# Patient Record
Sex: FEMALE | Race: WHITE | URBAN - METROPOLITAN AREA
[De-identification: names, ages, dates, MRNs, and addresses within clinical notes are randomized per-mention and may not be internally consistent; named-entity substitution may affect disease eponyms.]

---

## 2018-07-09 ENCOUNTER — INPATIENT (INPATIENT)
Facility: HOSPITAL | Age: 58
LOS: 1 days | Discharge: HOME | End: 2018-07-11
Attending: FAMILY MEDICINE | Admitting: FAMILY MEDICINE
Payer: MEDICAID

## 2018-07-09 VITALS — HEIGHT: 64 IN | WEIGHT: 160.06 LBS

## 2018-07-09 LAB
ALBUMIN SERPL ELPH-MCNC: 4.1 G/DL — SIGNIFICANT CHANGE UP (ref 3.3–5)
ALP SERPL-CCNC: 81 U/L — SIGNIFICANT CHANGE UP (ref 40–120)
ALT FLD-CCNC: 33 U/L — SIGNIFICANT CHANGE UP (ref 12–78)
ANION GAP SERPL CALC-SCNC: 6 MMOL/L — SIGNIFICANT CHANGE UP (ref 5–17)
APPEARANCE UR: ABNORMAL
APTT BLD: 33.6 SEC — SIGNIFICANT CHANGE UP (ref 27.5–37.4)
AST SERPL-CCNC: 18 U/L — SIGNIFICANT CHANGE UP (ref 15–37)
BACTERIA # UR AUTO: ABNORMAL
BASOPHILS # BLD AUTO: 0.06 K/UL — SIGNIFICANT CHANGE UP (ref 0–0.2)
BASOPHILS NFR BLD AUTO: 0.8 % — SIGNIFICANT CHANGE UP (ref 0–2)
BILIRUB SERPL-MCNC: 0.5 MG/DL — SIGNIFICANT CHANGE UP (ref 0.2–1.2)
BILIRUB UR-MCNC: ABNORMAL
BUN SERPL-MCNC: 23 MG/DL — SIGNIFICANT CHANGE UP (ref 7–23)
CALCIUM SERPL-MCNC: 9.8 MG/DL — SIGNIFICANT CHANGE UP (ref 8.5–10.1)
CHLORIDE SERPL-SCNC: 108 MMOL/L — SIGNIFICANT CHANGE UP (ref 96–108)
CO2 SERPL-SCNC: 28 MMOL/L — SIGNIFICANT CHANGE UP (ref 22–31)
COD CRY URNS QL: ABNORMAL
COLOR SPEC: ABNORMAL
CREAT SERPL-MCNC: 1.05 MG/DL — SIGNIFICANT CHANGE UP (ref 0.5–1.3)
DIFF PNL FLD: ABNORMAL
EOSINOPHIL # BLD AUTO: 0.18 K/UL — SIGNIFICANT CHANGE UP (ref 0–0.5)
EOSINOPHIL NFR BLD AUTO: 2.3 % — SIGNIFICANT CHANGE UP (ref 0–6)
EPI CELLS # UR: SIGNIFICANT CHANGE UP
GLUCOSE SERPL-MCNC: 100 MG/DL — HIGH (ref 70–99)
GLUCOSE UR QL: NEGATIVE MG/DL — SIGNIFICANT CHANGE UP
HCT VFR BLD CALC: 45.7 % — HIGH (ref 34.5–45)
HGB BLD-MCNC: 15.8 G/DL — HIGH (ref 11.5–15.5)
IMM GRANULOCYTES NFR BLD AUTO: 0.1 % — SIGNIFICANT CHANGE UP (ref 0–1.5)
INR BLD: 1.03 RATIO — SIGNIFICANT CHANGE UP (ref 0.88–1.16)
KETONES UR-MCNC: NEGATIVE — SIGNIFICANT CHANGE UP
LEUKOCYTE ESTERASE UR-ACNC: NEGATIVE — SIGNIFICANT CHANGE UP
LIDOCAIN IGE QN: 153 U/L — SIGNIFICANT CHANGE UP (ref 73–393)
LYMPHOCYTES # BLD AUTO: 2.84 K/UL — SIGNIFICANT CHANGE UP (ref 1–3.3)
LYMPHOCYTES # BLD AUTO: 35.8 % — SIGNIFICANT CHANGE UP (ref 13–44)
MCHC RBC-ENTMCNC: 29.6 PG — SIGNIFICANT CHANGE UP (ref 27–34)
MCHC RBC-ENTMCNC: 34.6 GM/DL — SIGNIFICANT CHANGE UP (ref 32–36)
MCV RBC AUTO: 85.7 FL — SIGNIFICANT CHANGE UP (ref 80–100)
MONOCYTES # BLD AUTO: 0.74 K/UL — SIGNIFICANT CHANGE UP (ref 0–0.9)
MONOCYTES NFR BLD AUTO: 9.3 % — SIGNIFICANT CHANGE UP (ref 2–14)
NEUTROPHILS # BLD AUTO: 4.11 K/UL — SIGNIFICANT CHANGE UP (ref 1.8–7.4)
NEUTROPHILS NFR BLD AUTO: 51.7 % — SIGNIFICANT CHANGE UP (ref 43–77)
NITRITE UR-MCNC: POSITIVE
NRBC # BLD: 0 /100 WBCS — SIGNIFICANT CHANGE UP (ref 0–0)
PH UR: 5 — SIGNIFICANT CHANGE UP (ref 5–8)
PLATELET # BLD AUTO: 284 K/UL — SIGNIFICANT CHANGE UP (ref 150–400)
POTASSIUM SERPL-MCNC: 4.4 MMOL/L — SIGNIFICANT CHANGE UP (ref 3.5–5.3)
POTASSIUM SERPL-SCNC: 4.4 MMOL/L — SIGNIFICANT CHANGE UP (ref 3.5–5.3)
PROT SERPL-MCNC: 7.9 GM/DL — SIGNIFICANT CHANGE UP (ref 6–8.3)
PROT UR-MCNC: NEGATIVE MG/DL — SIGNIFICANT CHANGE UP
PROTHROM AB SERPL-ACNC: 11.1 SEC — SIGNIFICANT CHANGE UP (ref 9.8–12.7)
RBC # BLD: 5.33 M/UL — HIGH (ref 3.8–5.2)
RBC # FLD: 12.4 % — SIGNIFICANT CHANGE UP (ref 10.3–14.5)
RBC CASTS # UR COMP ASSIST: >50 /HPF (ref 0–4)
SODIUM SERPL-SCNC: 142 MMOL/L — SIGNIFICANT CHANGE UP (ref 135–145)
SP GR SPEC: 1.02 — SIGNIFICANT CHANGE UP (ref 1.01–1.02)
UROBILINOGEN FLD QL: 8 MG/DL
WBC # BLD: 7.94 K/UL — SIGNIFICANT CHANGE UP (ref 3.8–10.5)
WBC # FLD AUTO: 7.94 K/UL — SIGNIFICANT CHANGE UP (ref 3.8–10.5)
WBC UR QL: SIGNIFICANT CHANGE UP

## 2018-07-09 PROCEDURE — 74176 CT ABD & PELVIS W/O CONTRAST: CPT | Mod: 26

## 2018-07-09 PROCEDURE — 71045 X-RAY EXAM CHEST 1 VIEW: CPT | Mod: 26

## 2018-07-09 PROCEDURE — 93010 ELECTROCARDIOGRAM REPORT: CPT

## 2018-07-09 RX ORDER — SODIUM CHLORIDE 9 MG/ML
1000 INJECTION INTRAMUSCULAR; INTRAVENOUS; SUBCUTANEOUS ONCE
Qty: 0 | Refills: 0 | Status: COMPLETED | OUTPATIENT
Start: 2018-07-09 | End: 2018-07-09

## 2018-07-09 RX ORDER — SODIUM CHLORIDE 9 MG/ML
3 INJECTION INTRAMUSCULAR; INTRAVENOUS; SUBCUTANEOUS ONCE
Qty: 0 | Refills: 0 | Status: COMPLETED | OUTPATIENT
Start: 2018-07-09 | End: 2018-07-09

## 2018-07-09 RX ORDER — CEFTRIAXONE 500 MG/1
1 INJECTION, POWDER, FOR SOLUTION INTRAMUSCULAR; INTRAVENOUS ONCE
Qty: 0 | Refills: 0 | Status: DISCONTINUED | OUTPATIENT
Start: 2018-07-09 | End: 2018-07-09

## 2018-07-09 RX ORDER — CEFTRIAXONE 500 MG/1
1000 INJECTION, POWDER, FOR SOLUTION INTRAMUSCULAR; INTRAVENOUS ONCE
Qty: 0 | Refills: 0 | Status: COMPLETED | OUTPATIENT
Start: 2018-07-09 | End: 2018-07-09

## 2018-07-09 RX ORDER — TAMSULOSIN HYDROCHLORIDE 0.4 MG/1
0.4 CAPSULE ORAL AT BEDTIME
Qty: 0 | Refills: 0 | Status: DISCONTINUED | OUTPATIENT
Start: 2018-07-09 | End: 2018-07-11

## 2018-07-09 RX ORDER — KETOROLAC TROMETHAMINE 30 MG/ML
30 SYRINGE (ML) INJECTION ONCE
Qty: 0 | Refills: 0 | Status: DISCONTINUED | OUTPATIENT
Start: 2018-07-09 | End: 2018-07-09

## 2018-07-09 RX ADMIN — SODIUM CHLORIDE 1000 MILLILITER(S): 9 INJECTION INTRAMUSCULAR; INTRAVENOUS; SUBCUTANEOUS at 20:32

## 2018-07-09 RX ADMIN — CEFTRIAXONE 1000 MILLIGRAM(S): 500 INJECTION, POWDER, FOR SOLUTION INTRAMUSCULAR; INTRAVENOUS at 23:03

## 2018-07-09 RX ADMIN — SODIUM CHLORIDE 3 MILLILITER(S): 9 INJECTION INTRAMUSCULAR; INTRAVENOUS; SUBCUTANEOUS at 20:33

## 2018-07-09 RX ADMIN — Medication 30 MILLIGRAM(S): at 20:45

## 2018-07-09 RX ADMIN — Medication 30 MILLIGRAM(S): at 20:32

## 2018-07-09 NOTE — ED ADULT NURSE REASSESSMENT NOTE - REASSESS COMMUNICATION
pt continues to state relief of pain. Pt urinated without difficulty at this time. Bladder scan previously shower 491cc prior to urinating. pt sts 2 episodes of not being able to urinate. denies dysuria. Dr harrington aware of all.

## 2018-07-09 NOTE — ED STATDOCS - PROGRESS NOTE DETAILS
56 y/o female with a PMHx of HTN presents to the ED c/o mild dysuria, lower abd pressure, back pain x1 week. Pt was dx with a UTI 5 days ago with + blood in urine. Pt was on Cipro for 3 days and then Keflex for 2 days with no relief. Today, pt took pain medication at 15:00 and then Tylenol at 18:00. No nausea, vomiting. NKDA. Patient seen and evaluated, ED attending note and orders reviewed, will continue with patient follow up and care.  -Christian PEREZ, PAYossiC Kash Aguilera.  Arely Kingsley PA-C I spoke with Dr. Aguilera and he agrees with plan of care for admission.  He will consult on patient tomorrow.  Arely Kingsley PA-C

## 2018-07-09 NOTE — ED STATDOCS - OBJECTIVE STATEMENT
58 y/o female with a PMHx of HTN presents to the ED c/o mild dysuria, lower abd pressure, back pain x1 week. Pt was dx with a UTI 5 days ago with + blood in urine. Pt was on Cipro for 3 days and then Keflex for 2 days with no relief. Today, pt took pain medication at 15:00 and then Tylenol at 18:00. No nausea, vomiting. NKDA.

## 2018-07-09 NOTE — ED STATDOCS - NS_ ATTENDINGSCRIBEDETAILS _ED_A_ED_FT
I, Garrick Bedolla MD,  performed the initial face to face bedside interview with this patient regarding history of present illness, review of symptoms and relevant past medical, social and family history.  I completed an independent physical examination.    The history, relevant review of systems, past medical and surgical history, medical decision making, and physical examination was documented by the scribe in my presence and I attest to the accuracy of the documentation.

## 2018-07-09 NOTE — ED STATDOCS - ATTENDING CONTRIBUTION TO CARE
I, Garrick Bedolla MD,  performed the initial face to face bedside interview with this patient regarding history of present illness, review of symptoms and relevant past medical, social and family history.  I completed an independent physical examination.  I was the initial provider who evaluated this patient. I have signed out the follow up of any pending tests (i.e. labs, radiological studies) to the ACP.  I have communicated the patient’s plan of care and disposition with the ACP.

## 2018-07-09 NOTE — ED ADULT TRIAGE NOTE - CHIEF COMPLAINT QUOTE
lt sided kidney pain  from MOnday denies fever +diarrhea 3times today  no blood in urine  pt on abx po meds all most week not working

## 2018-07-10 ENCOUNTER — RESULT REVIEW (OUTPATIENT)
Age: 58
End: 2018-07-10

## 2018-07-10 DIAGNOSIS — N39.0 URINARY TRACT INFECTION, SITE NOT SPECIFIED: ICD-10-CM

## 2018-07-10 DIAGNOSIS — N13.2 HYDRONEPHROSIS WITH RENAL AND URETERAL CALCULOUS OBSTRUCTION: ICD-10-CM

## 2018-07-10 DIAGNOSIS — N23 UNSPECIFIED RENAL COLIC: ICD-10-CM

## 2018-07-10 PROCEDURE — 88300 SURGICAL PATH GROSS: CPT | Mod: 26

## 2018-07-10 PROCEDURE — 52356 CYSTO/URETERO W/LITHOTRIPSY: CPT | Mod: LT

## 2018-07-10 PROCEDURE — 99285 EMERGENCY DEPT VISIT HI MDM: CPT

## 2018-07-10 PROCEDURE — 99253 IP/OBS CNSLTJ NEW/EST LOW 45: CPT | Mod: 25

## 2018-07-10 RX ORDER — IBUPROFEN 200 MG
400 TABLET ORAL EVERY 6 HOURS
Qty: 0 | Refills: 0 | Status: DISCONTINUED | OUTPATIENT
Start: 2018-07-10 | End: 2018-07-10

## 2018-07-10 RX ORDER — OXYCODONE HYDROCHLORIDE 5 MG/1
10 TABLET ORAL EVERY 6 HOURS
Qty: 0 | Refills: 0 | Status: DISCONTINUED | OUTPATIENT
Start: 2018-07-10 | End: 2018-07-10

## 2018-07-10 RX ORDER — DOCUSATE SODIUM 100 MG
100 CAPSULE ORAL THREE TIMES A DAY
Qty: 0 | Refills: 0 | Status: DISCONTINUED | OUTPATIENT
Start: 2018-07-10 | End: 2018-07-11

## 2018-07-10 RX ORDER — ONDANSETRON 8 MG/1
4 TABLET, FILM COATED ORAL ONCE
Qty: 0 | Refills: 0 | Status: DISCONTINUED | OUTPATIENT
Start: 2018-07-10 | End: 2018-07-10

## 2018-07-10 RX ORDER — PHENAZOPYRIDINE HCL 100 MG
100 TABLET ORAL THREE TIMES A DAY
Qty: 0 | Refills: 0 | Status: DISCONTINUED | OUTPATIENT
Start: 2018-07-10 | End: 2018-07-11

## 2018-07-10 RX ORDER — CEFTRIAXONE 500 MG/1
1000 INJECTION, POWDER, FOR SOLUTION INTRAMUSCULAR; INTRAVENOUS EVERY 24 HOURS
Qty: 0 | Refills: 0 | Status: DISCONTINUED | OUTPATIENT
Start: 2018-07-10 | End: 2018-07-11

## 2018-07-10 RX ORDER — OXYCODONE HYDROCHLORIDE 5 MG/1
5 TABLET ORAL EVERY 6 HOURS
Qty: 0 | Refills: 0 | Status: DISCONTINUED | OUTPATIENT
Start: 2018-07-10 | End: 2018-07-11

## 2018-07-10 RX ORDER — AMLODIPINE BESYLATE 2.5 MG/1
10 TABLET ORAL AT BEDTIME
Qty: 0 | Refills: 0 | Status: DISCONTINUED | OUTPATIENT
Start: 2018-07-10 | End: 2018-07-11

## 2018-07-10 RX ORDER — CEFTRIAXONE 500 MG/1
1 INJECTION, POWDER, FOR SOLUTION INTRAMUSCULAR; INTRAVENOUS EVERY 24 HOURS
Qty: 0 | Refills: 0 | Status: DISCONTINUED | OUTPATIENT
Start: 2018-07-10 | End: 2018-07-10

## 2018-07-10 RX ORDER — FENTANYL CITRATE 50 UG/ML
50 INJECTION INTRAVENOUS
Qty: 0 | Refills: 0 | Status: DISCONTINUED | OUTPATIENT
Start: 2018-07-10 | End: 2018-07-10

## 2018-07-10 RX ORDER — KETOROLAC TROMETHAMINE 30 MG/ML
15 SYRINGE (ML) INJECTION EVERY 6 HOURS
Qty: 0 | Refills: 0 | Status: DISCONTINUED | OUTPATIENT
Start: 2018-07-10 | End: 2018-07-11

## 2018-07-10 RX ORDER — ACETAMINOPHEN 500 MG
650 TABLET ORAL EVERY 6 HOURS
Qty: 0 | Refills: 0 | Status: DISCONTINUED | OUTPATIENT
Start: 2018-07-10 | End: 2018-07-11

## 2018-07-10 RX ORDER — SODIUM CHLORIDE 9 MG/ML
1000 INJECTION INTRAMUSCULAR; INTRAVENOUS; SUBCUTANEOUS
Qty: 0 | Refills: 0 | Status: DISCONTINUED | OUTPATIENT
Start: 2018-07-10 | End: 2018-07-11

## 2018-07-10 RX ORDER — SODIUM CHLORIDE 9 MG/ML
1000 INJECTION, SOLUTION INTRAVENOUS
Qty: 0 | Refills: 0 | Status: DISCONTINUED | OUTPATIENT
Start: 2018-07-10 | End: 2018-07-10

## 2018-07-10 RX ADMIN — CEFTRIAXONE 1000 MILLIGRAM(S): 500 INJECTION, POWDER, FOR SOLUTION INTRAMUSCULAR; INTRAVENOUS at 22:09

## 2018-07-10 RX ADMIN — SODIUM CHLORIDE 125 MILLILITER(S): 9 INJECTION INTRAMUSCULAR; INTRAVENOUS; SUBCUTANEOUS at 13:50

## 2018-07-10 RX ADMIN — SODIUM CHLORIDE 75 MILLILITER(S): 9 INJECTION, SOLUTION INTRAVENOUS at 21:48

## 2018-07-10 RX ADMIN — AMLODIPINE BESYLATE 10 MILLIGRAM(S): 2.5 TABLET ORAL at 22:10

## 2018-07-10 RX ADMIN — Medication 100 MILLIGRAM(S): at 22:10

## 2018-07-10 RX ADMIN — Medication 100 MILLIGRAM(S): at 23:18

## 2018-07-10 RX ADMIN — TAMSULOSIN HYDROCHLORIDE 0.4 MILLIGRAM(S): 0.4 CAPSULE ORAL at 22:10

## 2018-07-10 RX ADMIN — TAMSULOSIN HYDROCHLORIDE 0.4 MILLIGRAM(S): 0.4 CAPSULE ORAL at 00:13

## 2018-07-10 RX ADMIN — SODIUM CHLORIDE 1000 MILLILITER(S): 9 INJECTION INTRAMUSCULAR; INTRAVENOUS; SUBCUTANEOUS at 00:13

## 2018-07-10 NOTE — BRIEF OPERATIVE NOTE - COMMENTS
Can be discharged tomorrow if stable from  standpoint.   Continue antibiotics, follow cultures, treat per sensitivity.   Will follow as out pt for Cystoscopy and stent removal.

## 2018-07-10 NOTE — CONSULT NOTE ADULT - SUBJECTIVE AND OBJECTIVE BOX
CHIEF COMPLAINT:  4 mm obstructing ureteral stone    HISTORY OF PRESENT ILLNESS:    PAST MEDICAL & SURGICAL HISTORY:      REVIEW OF SYSTEMS:    CONSTITUTIONAL: No weakness, fevers or chills  EYES/ENT: No visual changes;  No vertigo or throat pain   NECK: No pain or stiffness  RESPIRATORY: No cough, wheezing, hemoptysis, No shortness of breath  CARDIOVASCULAR: No chest pain or palpitations  GASTROINTESTINAL: No abdominal or flank pain, No nausea, vomiting, diarrhea or constipation  GENITOURINARY: See HPI  NEUROLOGICAL: No numbness or weakness  SKIN: No rashes or lesions   All other review of systems is negative unless indicated above.    MEDICATIONS  (STANDING):  amLODIPine   Tablet 10 milliGRAM(s) Oral at bedtime  cefTRIAXone Injectable. 1000 milliGRAM(s) IV Push every 24 hours  docusate sodium 100 milliGRAM(s) Oral three times a day  sodium chloride 0.9%. 1000 milliLiter(s) (125 mL/Hr) IV Continuous <Continuous>  tamsulosin 0.4 milliGRAM(s) Oral at bedtime    MEDICATIONS  (PRN):  acetaminophen   Tablet. 650 milliGRAM(s) Oral every 6 hours PRN Mild Pain (1 - 3)  ketorolac   Injectable 15 milliGRAM(s) IV Push every 6 hours PRN Mild Pain (1 - 3)  oxyCODONE    IR 5 milliGRAM(s) Oral every 6 hours PRN Moderate Pain (4 - 6)      Allergies    No Known Allergies    Intolerances        SOCIAL HISTORY:    FAMILY HISTORY:      Vital Signs Last 24 Hrs  T(C): 37.2 (10 Jul 2018 11:09), Max: 37.2 (10 Jul 2018 11:09)  T(F): 99 (10 Jul 2018 11:09), Max: 99 (10 Jul 2018 11:09)  HR: 67 (10 Jul 2018 11:09) (67 - 86)  BP: 133/76 (10 Jul 2018 11:09) (125/75 - 148/100)  BP(mean): --  RR: 16 (10 Jul 2018 11:09) (16 - 18)  SpO2: 99% (10 Jul 2018 11:09) (98% - 99%)    PHYSICAL EXAM:    Constitutional: No acute distress  HEENT: EOMI, Normal Hearing  Neck: Supple  Back: No costovertebral angle tenderness  Respiratory: Normal respiratory effort    Cardiovascular: Normal peripheral circulation   Abd: Soft, non distended, non tender  : Normal urethra, ***circumcised penis, bilateral normal to palpate  CHELSEA: Prostate- *** gms, non tender, no nodules  Extremities: No peripheral edema  Neurological: No focal deficits  Psychiatric: Normal mood, normal affect  Musculoskeletal: Moving all 4 extremities  Skin: No rashes    LABS:                        15.8   7.94  )-----------( 284      ( 2018 20:12 )             45.7     -    142  |  108  |  23  ----------------------------<  100<H>  4.4   |  28  |  1.05    Ca    9.8      2018 20:12    TPro  7.9  /  Alb  4.1  /  TBili  0.5  /  DBili  x   /  AST  18  /  ALT  33  /  AlkPhos  81  07-09    PT/INR - ( 2018 20:12 )   PT: 11.1 sec;   INR: 1.03 ratio         PTT - ( 2018 20:12 )  PTT:33.6 sec  Urinalysis Basic - ( 2018 20:12 )    Color: Red / Appearance: Slightly Turbid / S.025 / pH: x  Gluc: x / Ketone: Negative  / Bili: Large / Urobili: 8 mg/dL   Blood: x / Protein: Negative mg/dL / Nitrite: Positive   Leuk Esterase: Negative / RBC: >50 /HPF / WBC 3-5   Sq Epi: x / Non Sq Epi: Occasional / Bacteria: Occasional      Urine Culture:     RADIOLOGY & ADDITIONAL STUDIES: CHIEF COMPLAINT:  left 4 mm obstructing ureteral stone    HISTORY OF PRESENT ILLNESS:  58 yo female presented to ED c/o severe left sided flank pain for 1 day, had been having low grade pain for few days and was also getting antibiotics for possible UTI for c/o dysuria.   Denies hematuria, fever, chills, rigors, nausea or vomiting.   No prior episodes of kidney stone, mother had kidney stones?     UCx, no growth (d/w PMD's office).          PAST MEDICAL & SURGICAL HISTORY:      REVIEW OF SYSTEMS:    CONSTITUTIONAL: No weakness, fevers or chills  EYES/ENT: No visual changes;  No vertigo or throat pain   NECK: No pain or stiffness  RESPIRATORY: No cough, wheezing, hemoptysis, No shortness of breath  CARDIOVASCULAR: No chest pain or palpitations  GASTROINTESTINAL: No nausea, vomiting, diarrhea or constipation  GENITOURINARY: See HPI  NEUROLOGICAL: No numbness or weakness  SKIN: No rashes or lesions   All other review of systems is negative unless indicated above.    MEDICATIONS  (STANDING):  amLODIPine   Tablet 10 milliGRAM(s) Oral at bedtime  cefTRIAXone Injectable. 1000 milliGRAM(s) IV Push every 24 hours  docusate sodium 100 milliGRAM(s) Oral three times a day  sodium chloride 0.9%. 1000 milliLiter(s) (125 mL/Hr) IV Continuous <Continuous>  tamsulosin 0.4 milliGRAM(s) Oral at bedtime    MEDICATIONS  (PRN):  acetaminophen   Tablet. 650 milliGRAM(s) Oral every 6 hours PRN Mild Pain (1 - 3)  ketorolac   Injectable 15 milliGRAM(s) IV Push every 6 hours PRN Mild Pain (1 - 3)  oxyCODONE    IR 5 milliGRAM(s) Oral every 6 hours PRN Moderate Pain (4 - 6)      Allergies    No Known Allergies    Intolerances        SOCIAL HISTORY:    FAMILY HISTORY:      Vital Signs Last 24 Hrs  T(C): 37.2 (10 Jul 2018 11:09), Max: 37.2 (10 Jul 2018 11:09)  T(F): 99 (10 Jul 2018 11:09), Max: 99 (10 Jul 2018 11:09)  HR: 67 (10 Jul 2018 11:09) (67 - 86)  BP: 133/76 (10 Jul 2018 11:09) (125/75 - 148/100)  BP(mean): --  RR: 16 (10 Jul 2018 11:09) (16 - 18)  SpO2: 99% (10 Jul 2018 11:09) (98% - 99%)    PHYSICAL EXAM:    Constitutional: No acute distress  HEENT: EOMI, Normal Hearing  Neck: Supple  Back: No costovertebral angle tenderness  Respiratory: Normal respiratory effort    Cardiovascular: Normal peripheral circulation   Abd: Soft, non distended, non tender  Extremities: No peripheral edema  Neurological: No focal deficits  Psychiatric: Normal mood, normal affect  Musculoskeletal: Moving all 4 extremities  Skin: No rashes    LABS:                        15.8   7.94  )-----------( 284      ( 2018 20:12 )             45.7         142  |  108  |  23  ----------------------------<  100<H>  4.4   |  28  |  1.05    Ca    9.8      2018 20:12    TPro  7.9  /  Alb  4.1  /  TBili  0.5  /  DBili  x   /  AST  18  /  ALT  33  /  AlkPhos  81  09    PT/INR - ( 2018 20:12 )   PT: 11.1 sec;   INR: 1.03 ratio         PTT - ( 2018 20:12 )  PTT:33.6 sec  Urinalysis Basic - ( 2018 20:12 )    Color: Red / Appearance: Slightly Turbid / S.025 / pH: x  Gluc: x / Ketone: Negative  / Bili: Large / Urobili: 8 mg/dL   Blood: x / Protein: Negative mg/dL / Nitrite: Positive   Leuk Esterase: Negative / RBC: >50 /HPF / WBC 3-5   Sq Epi: x / Non Sq Epi: Occasional / Bacteria: Occasional      < from: CT Abdomen and Pelvis No Cont (18 @ 21:50) >   CT ABDOMEN AND PELVIS                            PROCEDURE DATE:  2018          INTERPRETATION:  CT ABDOMEN AND PELVIS WITHOUT CONTRAST    INDICATION: Left flank pain.    TECHNIQUE: Abdominopelvic CT without intravenous contrast.    COMPARISON: None.    FINDINGS:    Evaluation of the solid organs and vasculature limited in the absence of   intravenous contrast.     Lower Thorax: No consolidation or effusion.        Liver: No suspicious lesions.      Biliary: No dilatation.      Spleen: No suspicious lesions.      Pancreas: No inflammatory changes or ductal dilatation.      Adrenals: 2.6 cm right adrenal adenoma.  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 4 mm calculus, in the distal left ureter just proximal to the   UVJ resulting in mild hydronephrosis.  Vessels: Normal caliber. Mild atherosclerotic disease of the aorta and   its branches.    GI tract: No evidence of small bowel obstruction. No wall thickening or   inflammatory changes.        Peritoneum/retroperitoneum and mesentery: No free air. No organized fluid   collection. No adenopathy.        Pelvic organs/Bladder: No pelvic masses. Bladder is normal.        Abdominal wall: Unremarkable.  Bones and soft tissues: No destructive lesion. Degenerative changes of   the spine, disc space narrowing L5-S1.    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   4 mm calculus, in the distal left ureter just proximal to the UVJ   resulting in mild hydronephrosis.    < end of copied text >

## 2018-07-10 NOTE — H&P ADULT - ASSESSMENT
57F.  admitted 07/10/18.  presented to ED c/o left sided flank pain.  onset 8 days prior to admission.  at worst, "20 out of 10" in severity.  no palliative or provocative factors.  radiates from left flank, up and down lower back.  a/w dysuria, "burning."  patient presented initially to PMD.  tx'd w/ PO ABx (Cipro x 3 days, then Keflex x 2 days) w/o improvement.  07/05 UCx, no growth (d/w PMD's office).  in ED, patient UA + RBCs.  -WBCs.  CT 4mm stone left distal ureter near UVJ.  partially duplicated collecting system.  given ceftriaxone, Flomax and IVFs.  Urology consult was called.    nephrolithiasis.  mild HN.  renal colic.  -4mm left ureteral stone.  congenital anomaly noted, partially duplicated left collecting system.  -UCx.  -ceftriaxone.  -Flomax.  -IVFs.  -pain management.  -Urology.    DVT prophylaxis.  -ambulation.    disposition.  -TCU.    communication.  -RN. 57F.  admitted 07/10/18.  presented to ED c/o left sided flank pain.  onset 8 days prior to admission.  at worst, "20 out of 10" in severity.  no palliative or provocative factors.  radiates from left flank, up and down lower back.  a/w dysuria, "burning."  patient presented initially to PMD.  tx'd w/ PO ABx (Cipro x 3 days, then Keflex x 2 days) w/o improvement.  07/05 UCx, no growth (d/w PMD's office).  in ED, patient UA + RBCs.  -WBCs.  CT 4mm stone left distal ureter near UVJ.  partially duplicated collecting system.  given ceftriaxone, Flomax and IVFs.  Urology consult was called.    nephrolithiasis.  mild HN.  renal colic.  -4mm left ureteral stone (likely calcium oxolate).    -congenital anomaly noted, partially duplicated left collecting system.  -UCx.  -strain urine.  -ceftriaxone.  -Flomax.  -IVFs.  -pain management.  -Urology.    DVT prophylaxis.  -ambulation.    disposition.  -TCU.    communication.  -RN.

## 2018-07-10 NOTE — ED ADULT NURSE REASSESSMENT NOTE - NS ED NURSE REASSESS COMMENT FT1
Pt received at 0700, alert and orientedx4, VSS afebrile, pt resting comfortably in bed, breakfast ordered for patient, no complaints or pain or discomfort, safety maintaiend will continue to monitor pt updated on plan of care

## 2018-07-10 NOTE — CONSULT NOTE ADULT - ASSESSMENT
Booked for OR Left ureteroscopy, laser lithotripsy, stone extraction and stent placement   NPO Discussed treatment options and recommended Cystoscopy and left ureteral stent placement.   Mentioned will need definitive procedure later. Pt has no insurance and is requesting stone be taken care of also.   Discussed will attempt since it is distal stone. Discussed risks and benefits. Pt agreeable.   Booked for OR Left ureteroscopy, laser lithotripsy, stone extraction and stent placement.   NPO.  Continue IV Fluids.   Continue antibiotics, follow cultures, treat per sensitivity.

## 2018-07-10 NOTE — H&P ADULT - NSHPROSALLOTHERNEGRD_GEN_ALL_CORE
Repair Type: Intermediate 8 Mm Punch Excision Text: A 8 mm punch biopsy was used to excise the lesion to the level of the subcutaneous fat.  Blunt dissection was used to free the lesion from the surrounding tissues and the lesion was removed. All other review of systems negative, except as noted in HPI 7 Mm Punch Excision Text: A 7 mm punch biopsy was used to excise the lesion to the level of the subcutaneous fat.  Blunt dissection was used to free the lesion from the surrounding tissues and the lesion was removed. Billing Type: Third-Party Bill Lab Facility: 2 Epidermal Closure: simple interrupted X Depth Of Punch In Cm (Optional): 0 Punch Size In Mm: 8 Number Of Epidermal Sutures (Optional): 5 Deep Sutures: 3-0 PGA Bill For Surgical Tray: yes 4.5 Mm Punch Excision Text: A 4.5 mm punch biopsy was used to excise the lesion to the level of the subcutaneous fat.  Blunt dissection was used to free the lesion from the surrounding tissues and the lesion was removed. Wound Care: Bacitracin 5 Mm Punch Excision Text: A 5 mm punch biopsy was used to excise the lesion to the level of the subcutaneous fat.  Blunt dissection was used to free the lesion from the surrounding tissues and the lesion was removed. Size Of Lesion (*Required): 0.8 3.5 Mm Punch Excision Text: A 3.5 mm punch biopsy was used to excise the lesion to the level of the subcutaneous fat.  Blunt dissection was used to free the lesion from the surrounding tissues and the lesion was removed. 2 Mm Punch Excision Text: A 2 mm punch biopsy was used to excise the lesion to the level of the subcutaneous fat.  Blunt dissection was used to free the lesion from the surrounding tissues and the lesion was removed. Bill [id number] For Specimen Handling/Conveyance To Laboratory?: no Hemostasis: Electrocautery Lab: 47 1.5 Mm Punch Excision Text: A 1.5 mm punch biopsy was used to excise the lesion to the level of the subcutaneous fat.  Blunt dissection was used to free the lesion from the surrounding tissues and the lesion was removed. 6 Mm Punch Excision Text: A 6 mm punch biopsy was used to excise the lesion to the level of the subcutaneous fat.  Blunt dissection was used to free the lesion from the surrounding tissues and the lesion was removed. Anesthesia Type: 1% lidocaine with epinephrine 12 Mm Punch Excision Text: A 12 mm punch biopsy was used to excise the lesion to the level of the subcutaneous fat.  Blunt dissection was used to free the lesion from the surrounding tissues and the lesion was removed. Consent was obtained from the patient. The risks and benefits to therapy were discussed in detail. Specifically, the risks of infection, scarring, bleeding, prolonged wound healing, incomplete removal, allergy to anesthesia, nerve injury and recurrence were addressed. Prior to the procedure, the treatment site was clearly identified and confirmed by the patient. All components of Universal Protocol/PAUSE Rule completed. Wound Dressings: a bandage 3 Mm Punch Excision Text: A 3 mm punch biopsy was used to excise the lesion to the level of the subcutaneous fat.  Blunt dissection was used to free the lesion from the surrounding tissues and the lesion was removed. Post-Care Instructions: I reviewed with the patient in detail post-care instructions. Patient is to keep the biopsy site dry overnight, and then apply bacitracin twice daily until healed. Patient may apply hydrogen peroxide soaks to remove any crusting. Epidermal Sutures: 3-0 Silk Intermediate / Complex Repair - Final Wound Length In Cm: 1.2 10 Mm Punch Excision Text: A 10 mm punch biopsy was used to excise the lesion to the level of the subcutaneous fat.  Blunt dissection was used to free the lesion from the surrounding tissues and the lesion was removed. Notification Instructions: Patient will be notified of biopsy results. However, patient instructed to call the office if not contacted within 2 weeks. 2.5 Mm Punch Excision Text: A 2.5 mm punch biopsy was used to excise the lesion to the level of the subcutaneous fat.  Blunt dissection was used to free the lesion from the surrounding tissues and the lesion was removed. Suture Removal: 14 days Detail Level: Detailed Medical Necessity Clause: This procedure was medically necessary because the lesion that was treated was: 4 Mm Punch Excision Text: A 4 mm punch biopsy was used to excise the lesion to the level of the subcutaneous fat.  Blunt dissection was used to free the lesion from the surrounding tissues and the lesion was removed.

## 2018-07-10 NOTE — H&P ADULT - HISTORY OF PRESENT ILLNESS
57F.  admitted 07/10/18.  presented to ED c/o left sided flank pain.  onset 8 days prior to admission.  at worst, "20 out of 10" in severity.  no palliative or provocative factors.  radiates from left flank, up and down lower back.  a/w dysuria, "burning."    patient presented initially to PMD.  tx'd w/ PO ABx (Cipro x 3 days, then Keflex x 2 days) w/o improvement.  07/05 UCx, no growth (d/w PMD's office).    at current time, + pain intractable during paroxysms, improved after pain NSAID.  - fever.  - NV.    in ED, patient UA + RBCs.  -WBCs.  CT 4mm stone left distal ureter near UVJ.  partially duplicated collecting system.  given ceftriaxone, Flomax and IVFs.  Urology consult was called.    PMHx:  HTN.  hyperlipidemia.    PSHx:  cholecystectomy ~ 20 years ago.    ALL:  NKDA.    SocHx:  .  lives alone in private home [place].  [place].  no tobacco.  no EtOH.  no illegal drugs. 57F.  admitted 07/10/18.  presented to ED c/o left sided flank pain.  onset 8 days prior to admission.  at worst, "20 out of 10" in severity.  no palliative or provocative factors.  radiates from left flank, up and down lower back.  a/w dysuria, "burning."    patient presented initially to PMD.  tx'd w/ PO ABx (Cipro x 3 days, then Keflex x 2 days) w/o improvement.  07/05 UCx, no growth (d/w PMD's office).    at current time, + pain intractable during paroxysms, improved after pain NSAID.  - fever.  - NV.    in ED, patient UA + RBCs.  -WBCs.  CT 4mm stone left distal ureter near UVJ.  partially duplicated collecting system.  given ceftriaxone, Flomax and IVFs.  Urology consult was called.    ROS:  all other review of symptoms are negative other than listed above.    PMHx:  HTN.  hyperlipidemia.    PSHx:  cholecystectomy ~ 20 years ago.    ALL:  NKDA.    SocHx:  .  [facility]  [place] [facility] [place].  no tobacco.  no EtOH.  no illegal drugs.      FHx:  + mother hx kidney stones.

## 2018-07-10 NOTE — BRIEF OPERATIVE NOTE - PROCEDURE
<<-----Click on this checkbox to enter Procedure Ureteroscopy with laser lithotripsy and stent placement  07/10/2018  Left  Active  [email or web address]

## 2018-07-10 NOTE — H&P ADULT - NSHPPHYSICALEXAM_GEN_ALL_CORE
Vital Signs Last 24 Hrs  T(C): 37.1 (10 Jul 2018 07:05), Max: 37.1 (10 Jul 2018 07:05)  T(F): 98.7 (10 Jul 2018 07:05), Max: 98.7 (10 Jul 2018 07:05)  HR: 69 (10 Jul 2018 07:05) (69 - 86)  BP: 125/75 (10 Jul 2018 07:05) (125/75 - 148/100)  BP(mean): --  RR: 16 (10 Jul 2018 07:05) (16 - 18)  SpO2: 99% (10 Jul 2018 07:05) (98% - 99%)    Constitutional: NAD, awake and alert, well-developed with good responses to questions, mentally intact.   HEENT: PERRL, EOMI, MMM.  Neck: Soft and supple, No carotid bruit, No JVD  Respiratory: Breath sounds are clear bilaterally, No wheezing, rales or rhonchi  Cardiovascular: S1 and S2, regular rate and rhythm, no murmur, rub or gallop.  Gastrointestinal: Bowel Sounds present, soft, nontender, nondistended, no guarding, no rebound, no mass.  Extremities: No peripheral edema  Vascular: 2+ peripheral pulses  Neurological: A/O x 3, no focal deficits  Musculoskeletal: 5/5 strength b/l upper and lower extremities  Skin:  no visible rashes.

## 2018-07-11 ENCOUNTER — TRANSCRIPTION ENCOUNTER (OUTPATIENT)
Age: 58
End: 2018-07-11

## 2018-07-11 VITALS
RESPIRATION RATE: 16 BRPM | OXYGEN SATURATION: 99 % | HEART RATE: 73 BPM | SYSTOLIC BLOOD PRESSURE: 126 MMHG | DIASTOLIC BLOOD PRESSURE: 67 MMHG | TEMPERATURE: 98 F

## 2018-07-11 LAB
ANION GAP SERPL CALC-SCNC: 9 MMOL/L — SIGNIFICANT CHANGE UP (ref 5–17)
BASOPHILS # BLD AUTO: 0.01 K/UL — SIGNIFICANT CHANGE UP (ref 0–0.2)
BASOPHILS NFR BLD AUTO: 0.2 % — SIGNIFICANT CHANGE UP (ref 0–2)
BUN SERPL-MCNC: 15 MG/DL — SIGNIFICANT CHANGE UP (ref 7–23)
CALCIUM SERPL-MCNC: 8.8 MG/DL — SIGNIFICANT CHANGE UP (ref 8.5–10.1)
CHLORIDE SERPL-SCNC: 111 MMOL/L — HIGH (ref 96–108)
CO2 SERPL-SCNC: 22 MMOL/L — SIGNIFICANT CHANGE UP (ref 22–31)
CREAT SERPL-MCNC: 0.57 MG/DL — SIGNIFICANT CHANGE UP (ref 0.5–1.3)
EOSINOPHIL # BLD AUTO: 0 K/UL — SIGNIFICANT CHANGE UP (ref 0–0.5)
EOSINOPHIL NFR BLD AUTO: 0 % — SIGNIFICANT CHANGE UP (ref 0–6)
GLUCOSE SERPL-MCNC: 145 MG/DL — HIGH (ref 70–99)
HCT VFR BLD CALC: 43.8 % — SIGNIFICANT CHANGE UP (ref 34.5–45)
HGB BLD-MCNC: 14.9 G/DL — SIGNIFICANT CHANGE UP (ref 11.5–15.5)
IMM GRANULOCYTES NFR BLD AUTO: 0.6 % — SIGNIFICANT CHANGE UP (ref 0–1.5)
LYMPHOCYTES # BLD AUTO: 1.29 K/UL — SIGNIFICANT CHANGE UP (ref 1–3.3)
LYMPHOCYTES # BLD AUTO: 20.7 % — SIGNIFICANT CHANGE UP (ref 13–44)
MCHC RBC-ENTMCNC: 29.4 PG — SIGNIFICANT CHANGE UP (ref 27–34)
MCHC RBC-ENTMCNC: 34 GM/DL — SIGNIFICANT CHANGE UP (ref 32–36)
MCV RBC AUTO: 86.4 FL — SIGNIFICANT CHANGE UP (ref 80–100)
MONOCYTES # BLD AUTO: 0.09 K/UL — SIGNIFICANT CHANGE UP (ref 0–0.9)
MONOCYTES NFR BLD AUTO: 1.4 % — LOW (ref 2–14)
NEUTROPHILS # BLD AUTO: 4.8 K/UL — SIGNIFICANT CHANGE UP (ref 1.8–7.4)
NEUTROPHILS NFR BLD AUTO: 77.1 % — HIGH (ref 43–77)
NRBC # BLD: 0 /100 WBCS — SIGNIFICANT CHANGE UP (ref 0–0)
PLATELET # BLD AUTO: 236 K/UL — SIGNIFICANT CHANGE UP (ref 150–400)
POTASSIUM SERPL-MCNC: 3.7 MMOL/L — SIGNIFICANT CHANGE UP (ref 3.5–5.3)
POTASSIUM SERPL-SCNC: 3.7 MMOL/L — SIGNIFICANT CHANGE UP (ref 3.5–5.3)
RBC # BLD: 5.07 M/UL — SIGNIFICANT CHANGE UP (ref 3.8–5.2)
RBC # FLD: 12.1 % — SIGNIFICANT CHANGE UP (ref 10.3–14.5)
SODIUM SERPL-SCNC: 142 MMOL/L — SIGNIFICANT CHANGE UP (ref 135–145)
SURGICAL PATHOLOGY FINAL REPORT - CH: SIGNIFICANT CHANGE UP
WBC # BLD: 6.23 K/UL — SIGNIFICANT CHANGE UP (ref 3.8–10.5)
WBC # FLD AUTO: 6.23 K/UL — SIGNIFICANT CHANGE UP (ref 3.8–10.5)

## 2018-07-11 RX ORDER — PHENAZOPYRIDINE HCL 100 MG
1 TABLET ORAL
Qty: 6 | Refills: 0 | OUTPATIENT
Start: 2018-07-11 | End: 2018-07-12

## 2018-07-11 RX ORDER — ACETAMINOPHEN 500 MG
2 TABLET ORAL
Qty: 0 | Refills: 0 | COMMUNITY
Start: 2018-07-11

## 2018-07-11 RX ORDER — AMLODIPINE BESYLATE 2.5 MG/1
1 TABLET ORAL
Qty: 14 | Refills: 0 | OUTPATIENT
Start: 2018-07-11 | End: 2018-07-24

## 2018-07-11 RX ORDER — CEFUROXIME AXETIL 250 MG
1 TABLET ORAL
Qty: 10 | Refills: 0 | OUTPATIENT
Start: 2018-07-11 | End: 2018-07-15

## 2018-07-11 RX ORDER — TAMSULOSIN HYDROCHLORIDE 0.4 MG/1
1 CAPSULE ORAL
Qty: 14 | Refills: 0 | OUTPATIENT
Start: 2018-07-11 | End: 2018-07-24

## 2018-07-11 RX ADMIN — Medication 15 MILLIGRAM(S): at 08:49

## 2018-07-11 RX ADMIN — Medication 100 MILLIGRAM(S): at 14:12

## 2018-07-11 RX ADMIN — SODIUM CHLORIDE 125 MILLILITER(S): 9 INJECTION INTRAMUSCULAR; INTRAVENOUS; SUBCUTANEOUS at 05:31

## 2018-07-11 RX ADMIN — SODIUM CHLORIDE 125 MILLILITER(S): 9 INJECTION INTRAMUSCULAR; INTRAVENOUS; SUBCUTANEOUS at 12:00

## 2018-07-11 RX ADMIN — Medication 100 MILLIGRAM(S): at 05:31

## 2018-07-11 RX ADMIN — Medication 15 MILLIGRAM(S): at 09:04

## 2018-07-11 NOTE — DISCHARGE NOTE ADULT - CARE PLAN
Principal Discharge DX:	Renal colic on left side  Goal:	see below.  Assessment and plan of treatment:	continue with antibiotics for 5 more days.  follow up with urologist as directed.

## 2018-07-11 NOTE — PROGRESS NOTE ADULT - ASSESSMENT
57F.  admitted 07/10/18.  presented to ED c/o left sided flank pain.  onset 8 days prior to admission.  at worst, "20 out of 10" in severity.  no palliative or provocative factors.  radiates from left flank, up and down lower back.  a/w dysuria, "burning."  patient presented initially to PMD.  tx'd w/ PO ABx (Cipro x 3 days, then Keflex x 2 days) w/o improvement.  07/05 UCx, no growth (d/w PMD's office).  in ED, patient UA + RBCs.  -WBCs.  CT 4mm stone left distal ureter near UVJ.  partially duplicated collecting system.  given ceftriaxone, Flomax and IVFs.  Urology consult was called.    nephrolithiasis.  mild HN.  renal colic.  -4mm left ureteral stone (likely calcium oxolate).    -congenital anomaly noted, partially duplicated left collecting system.  -07/10 left ureteroscopy, laser lithotripsy, stone extraction and stent placement.  -UCx.  -strain urine.  -ceftriaxone.  -Flomax.  -IVFs.  -pain management.  -Urology.    DVT prophylaxis.  -ambulation.    disposition.  -TCU.    communication.  -RN.

## 2018-07-11 NOTE — PROGRESS NOTE ADULT - SUBJECTIVE AND OBJECTIVE BOX
CC:  Patient is a 57y old  Female who presents with a chief complaint of kidney stone. (10 Jul 2018 09:07)    SUBJECTIVE:  offers no new complaints at current time.    ROS:  all other review of systems are negative unless indicated above.    acetaminophen   Tablet. 650 milliGRAM(s) Oral every 6 hours PRN  amLODIPine   Tablet 10 milliGRAM(s) Oral at bedtime  cefTRIAXone Injectable. 1000 milliGRAM(s) IV Push every 24 hours  docusate sodium 100 milliGRAM(s) Oral three times a day  ketorolac   Injectable 15 milliGRAM(s) IV Push every 6 hours PRN  oxyCODONE    IR 5 milliGRAM(s) Oral every 6 hours PRN  phenazopyridine 100 milliGRAM(s) Oral three times a day  sodium chloride 0.9%. 1000 milliLiter(s) IV Continuous <Continuous>  tamsulosin 0.4 milliGRAM(s) Oral at bedtime    T(C): 36.7 (07-11-18 @ 10:59), Max: 37.3 (07-10-18 @ 16:54)  HR: 73 (07-11-18 @ 10:59) (60 - 76)  BP: 126/67 (07-11-18 @ 10:59) (126/66 - 155/70)  RR: 16 (07-11-18 @ 10:59) (13 - 18)  SpO2: 99% (07-11-18 @ 10:59) (96% - 100%)    Constitutional: NAD, awake and alert, well-developed with good responses to questions, mentally intact.   HEENT: PERRL, EOMI, MMM.  Neck: Soft and supple, No carotid bruit, No JVD  Respiratory: Breath sounds are clear bilaterally, No wheezing, rales or rhonchi  Cardiovascular: S1 and S2, regular rate and rhythm, no murmur, rub or gallop.  Gastrointestinal: Bowel Sounds present, soft, nontender, nondistended, no guarding, no rebound, no mass.  Extremities: No peripheral edema  Vascular: 2+ peripheral pulses  Neurological: A/O x 3, no focal deficits  Musculoskeletal: 5/5 strength b/l upper and lower extremities  Skin:  no visible rashes.                         14.9   6.23  )-----------( 236      ( 11 Jul 2018 05:38 )             43.8     PT/INR - ( 09 Jul 2018 20:12 )   PT: 11.1 sec;   INR: 1.03 ratio         PTT - ( 09 Jul 2018 20:12 )  PTT:33.6 sec  07-11    142  |  111<H>  |  15  ----------------------------<  145<H>  3.7   |  22  |  0.57    Ca    8.8      11 Jul 2018 05:38    TPro  7.9  /  Alb  4.1  /  TBili  0.5  /  DBili  x   /  AST  18  /  ALT  33  /  AlkPhos  81  07-09

## 2018-07-11 NOTE — DISCHARGE NOTE ADULT - CARE PROVIDER_API CALL
Ray Aguilera), Urology  [address]  [address]  [place] [address]  Phone: [phone]  Fax: [phone]    Rj Bolanos), Family Medicine  [address] [place] [address]  Phone: [phone]  Fax: [phone]

## 2018-07-11 NOTE — DISCHARGE NOTE ADULT - MEDICATION SUMMARY - MEDICATIONS TO TAKE
I will START or STAY ON the medications listed below when I get home from the hospital:    acetaminophen 325 mg oral tablet  -- 2 tab(s) by mouth every 6 hours, As needed, Mild Pain (1 - 3)  -- Indication: For as needed for pain    tamsulosin 0.4 mg oral capsule  -- 1 cap(s) by mouth once a day (at bedtime)  -- Indication: For kindey stone    amLODIPine 10 mg oral tablet  -- 1 tab(s) by mouth once a day (at bedtime)  -- Indication: For hypertension    Ceftin 250 mg oral tablet  -- 1 tab(s) by mouth every 12 hours   -- Finish all this medication unless otherwise directed by prescriber.  Medication should be taken with plenty of water.  Take with food or milk.    -- Indication: For UTI (urinary tract infection)    phenazopyridine 100 mg oral tablet  -- 1 tab(s) by mouth 3 times a day, As Needed   -- Indication: For burning with urination

## 2018-07-11 NOTE — DISCHARGE NOTE ADULT - HOSPITAL COURSE
CC:  Patient is a 57y old  Female who presents with a chief complaint of kidney stone. (10 Jul 2018 09:07)    SUBJECTIVE:  offers no new complaints at current time.    ROS:  all other review of systems are negative unless indicated above.    57F.  admitted 07/10/18.  presented to ED c/o left sided flank pain.  onset 8 days prior to admission.  at worst, "20 out of 10" in severity.  no palliative or provocative factors.  radiates from left flank, up and down lower back.  a/w dysuria, "burning."  patient presented initially to PMD.  tx'd w/ PO ABx (Cipro x 3 days, then Keflex x 2 days) w/o improvement.  07/05 UCx, no growth (d/w PMD's office).  in ED, patient UA + RBCs.  -WBCs.  CT 4mm stone left distal ureter near UVJ.  partially duplicated collecting system.  given ceftriaxone, Flomax and IVFs.  Urology consult was called.    nephrolithiasis.  mild HN.  renal colic.  -4mm left ureteral stone (likely calcium oxolate).    -congenital anomaly noted, partially duplicated left collecting system.  -07/10 left ureteroscopy, laser lithotripsy, stone extraction and stent placement.  -urology to f/u 07/10 UCx.  -strain urine.  -Ceftin 250mg po q12 x 5 more days.  -Flomax.  -pain management.  -Urology outpatient.    disposition.  -may discharge today.  -d/c > 35 minutes.    communication.  -RN.  -PMD notified.  fax copy of d/c note.

## 2018-07-11 NOTE — DISCHARGE NOTE ADULT - PATIENT PORTAL LINK FT
You can access the [facility] Patient Portal, offered by [facility], by registering with the following website: [email or web address]

## 2018-07-12 PROBLEM — Z00.00 ENCOUNTER FOR PREVENTIVE HEALTH EXAMINATION: Status: ACTIVE | Noted: 2018-07-12

## 2018-07-12 LAB
CULTURE RESULTS: NO GROWTH — SIGNIFICANT CHANGE UP
NIGHT BLUE STAIN TISS: SIGNIFICANT CHANGE UP
SPECIMEN SOURCE: SIGNIFICANT CHANGE UP
SPECIMEN SOURCE: SIGNIFICANT CHANGE UP

## 2018-07-14 LAB — COMPN STONE: SIGNIFICANT CHANGE UP

## 2018-07-16 DIAGNOSIS — N13.6 PYONEPHROSIS: ICD-10-CM

## 2018-07-16 DIAGNOSIS — I10 ESSENTIAL (PRIMARY) HYPERTENSION: ICD-10-CM

## 2018-07-16 DIAGNOSIS — E78.5 HYPERLIPIDEMIA, UNSPECIFIED: ICD-10-CM

## 2018-07-18 ENCOUNTER — APPOINTMENT (OUTPATIENT)
Dept: UROLOGY | Facility: CLINIC | Age: 58
End: 2018-07-18
Payer: SELF-PAY

## 2018-07-18 VITALS
BODY MASS INDEX: 32.46 KG/M2 | SYSTOLIC BLOOD PRESSURE: 129 MMHG | OXYGEN SATURATION: 98 % | DIASTOLIC BLOOD PRESSURE: 80 MMHG | HEART RATE: 63 BPM | HEIGHT: 62 IN | WEIGHT: 176.37 LBS

## 2018-07-18 DIAGNOSIS — Z46.6 ENCOUNTER FOR FITTING AND ADJUSTMENT OF URINARY DEVICE: ICD-10-CM

## 2018-07-18 DIAGNOSIS — Z87.442 PERSONAL HISTORY OF URINARY CALCULI: ICD-10-CM

## 2018-07-18 DIAGNOSIS — Z63.4 DISAPPEARANCE AND DEATH OF FAMILY MEMBER: ICD-10-CM

## 2018-07-18 DIAGNOSIS — N20.1 CALCULUS OF URETER: ICD-10-CM

## 2018-07-18 DIAGNOSIS — Z78.9 OTHER SPECIFIED HEALTH STATUS: ICD-10-CM

## 2018-07-18 PROCEDURE — 52310 CYSTOSCOPY AND TREATMENT: CPT

## 2018-07-18 PROCEDURE — 99214 OFFICE O/P EST MOD 30 MIN: CPT | Mod: 25

## 2018-07-18 SDOH — SOCIAL STABILITY - SOCIAL INSECURITY: DISSAPEARANCE AND DEATH OF FAMILY MEMBER: Z63.4

## 2018-07-26 LAB
CULTURE RESULTS: SIGNIFICANT CHANGE UP
SPECIMEN SOURCE: SIGNIFICANT CHANGE UP

## 2018-09-01 LAB
CULTURE RESULTS: SIGNIFICANT CHANGE UP
SPECIMEN SOURCE: SIGNIFICANT CHANGE UP

## 2018-11-17 NOTE — PATIENT PROFILE ADULT. - AS SC BRADEN FRICTION
IVA HOSPITALIST  History and Physical     Austin Neal Patient Status:  Emergency    1941 MRN [id number]   Location [address] [facility] Attending Will, Alanna Mccain MD   Hosp Day # 0 PCP Hernán Nguyen MD     Chief Complaint: Braydon Kendall Social History:  reports that he quit smoking about 7 years ago. He has a 3.00 pack-year smoking history. he has never used smokeless tobacco. He reports that he drinks about 0.6 oz of alcohol per week. He reports that he does not use drugs.     Josué Johnson comprehensive 14 point review of systems was completed. Pertinent positives and negatives noted in the HPI.     Physical Exam:    /65   Pulse 87   Temp 98.1 °F (36.7 °C) (Temporal)   Resp 17   Ht 175.3 cm (5' 9\")   Wt 148 lb (67.1 kg)   SpO2 95% Weakness due to sepsis, physical therapy evaluation. 4. Hyponatremia  5. Hyperlipidemia    Quality:  · DVT Prophylaxis: Lovenox  · CODE status: Full Code  · Cuellar: None    Plan of care discussed with Patient, Wife.     Carline Perez MD  11/17/2018 (3) no apparent problem

## 2019-02-23 NOTE — ED ADULT NURSE REASSESSMENT NOTE - GENERAL PATIENT STATE
Tremulousness  Consider panic attack. R/o vertebral-basilar insufficiency with MRI and MRA head and neck. If normal can discharge from ED and treat for panic attack.  
cooperative

## 2020-02-29 ENCOUNTER — EMERGENCY (EMERGENCY)
Facility: HOSPITAL | Age: 60
LOS: 0 days | Discharge: HOME | End: 2020-02-29
Attending: EMERGENCY MEDICINE
Payer: SELF-PAY

## 2020-02-29 VITALS
OXYGEN SATURATION: 96 % | RESPIRATION RATE: 18 BRPM | HEART RATE: 66 BPM | DIASTOLIC BLOOD PRESSURE: 74 MMHG | TEMPERATURE: 99 F | SYSTOLIC BLOOD PRESSURE: 132 MMHG

## 2020-02-29 VITALS — WEIGHT: 179.9 LBS

## 2020-02-29 DIAGNOSIS — I10 ESSENTIAL (PRIMARY) HYPERTENSION: ICD-10-CM

## 2020-02-29 DIAGNOSIS — N20.1 CALCULUS OF URETER: ICD-10-CM

## 2020-02-29 DIAGNOSIS — R30.0 DYSURIA: ICD-10-CM

## 2020-02-29 DIAGNOSIS — Z87.442 PERSONAL HISTORY OF URINARY CALCULI: ICD-10-CM

## 2020-02-29 DIAGNOSIS — N39.0 URINARY TRACT INFECTION, SITE NOT SPECIFIED: ICD-10-CM

## 2020-02-29 LAB
APPEARANCE UR: CLEAR — SIGNIFICANT CHANGE UP
BILIRUB UR-MCNC: NEGATIVE — SIGNIFICANT CHANGE UP
COLOR SPEC: YELLOW — SIGNIFICANT CHANGE UP
DIFF PNL FLD: ABNORMAL
GLUCOSE UR QL: NEGATIVE MG/DL — SIGNIFICANT CHANGE UP
KETONES UR-MCNC: NEGATIVE — SIGNIFICANT CHANGE UP
LEUKOCYTE ESTERASE UR-ACNC: NEGATIVE — SIGNIFICANT CHANGE UP
NITRITE UR-MCNC: NEGATIVE — SIGNIFICANT CHANGE UP
PH UR: 5 — SIGNIFICANT CHANGE UP (ref 5–8)
PROT UR-MCNC: 15 MG/DL
SP GR SPEC: 1.02 — SIGNIFICANT CHANGE UP (ref 1.01–1.02)
UROBILINOGEN FLD QL: NEGATIVE MG/DL — SIGNIFICANT CHANGE UP

## 2020-02-29 PROCEDURE — 74176 CT ABD & PELVIS W/O CONTRAST: CPT

## 2020-02-29 PROCEDURE — 74176 CT ABD & PELVIS W/O CONTRAST: CPT | Mod: 26

## 2020-02-29 PROCEDURE — 81001 URINALYSIS AUTO W/SCOPE: CPT

## 2020-02-29 PROCEDURE — 87086 URINE CULTURE/COLONY COUNT: CPT

## 2020-02-29 PROCEDURE — 99284 EMERGENCY DEPT VISIT MOD MDM: CPT | Mod: 25

## 2020-02-29 PROCEDURE — 99283 EMERGENCY DEPT VISIT LOW MDM: CPT

## 2020-02-29 RX ORDER — TAMSULOSIN HYDROCHLORIDE 0.4 MG/1
1 CAPSULE ORAL
Qty: 14 | Refills: 0
Start: 2020-02-29 | End: 2020-03-13

## 2020-02-29 RX ORDER — CEPHALEXIN 500 MG
1 CAPSULE ORAL
Qty: 14 | Refills: 0
Start: 2020-02-29 | End: 2020-03-06

## 2020-02-29 NOTE — ED STATDOCS - PROGRESS NOTE DETAILS
58 y/o F with PMH of kidney stones s/p lithotripsy apx 1.5 years ago presents with dysuria x 3 days. States she was started on cipro at that time, notes no change in symptoms. Denies fever, chills, nausea, vomiting, hematuria. +back pain. Denies trauma. PE: Well appearing. Cardiac: s1s2, RRR. Lungs: CTAB. Abdomen: NBS x4, soft, nontender. no CVAT. MSK: +paraspinal lumbar TTP. A/P: UTI, will assess UA. D/w patient, if hematuria noted on UA will assess CT to evaluate for stone. - Ludin Bauer PA-C +4mm left ureter stone on CT. Will d/c with flomax, change cipro to keflex. Pt to follow up with Dr. Verma. Error in transmitting E-prescription at this time, will attempt to resend. - Ludin Bauer PA-C

## 2020-02-29 NOTE — ED ADULT NURSE NOTE - OBJECTIVE STATEMENT
patient axox3, c/o dysuria, burning and back pain with urination x3 days. patient denies n/v/d, fever, chills, chest pain, SOB. patient has been taking Cipro for UTI with no relief.

## 2020-02-29 NOTE — ED STATDOCS - ATTENDING CONTRIBUTION TO CARE
I, Walker Snell MD,  performed the initial face to face bedside interview with this patient regarding history of present illness, review of symptoms and relevant past medical, social and family history.  I completed an independent physical examination.  I was the initial provider who evaluated this patient. I have signed out the follow up of any pending tests (i.e. labs, radiological studies) to the ACP.  The ACP will complete the work up, interpret tests, administer medications if necessary and disposition the patient appropriately.  If questions arise the ACP is expected to contact me for consultation.

## 2020-02-29 NOTE — ED STATDOCS - CLINICAL SUMMARY MEDICAL DECISION MAKING FREE TEXT BOX
dysuria x3 days. likely from uncomplicated UTI. pt also experiencing b/l lumbosacral back pain which she cannot differentiate is from chronic back pain or similar kidney stone. if UA has blood, will consider labs and imaging since prior kidney stones require procedure. dysuria x3 days. likely from uncomplicated UTI. pt also experiencing b/l lumbosacral back pain which pt cannot differentiate wheter it is from chronic back pain or similar  to prior kidney stone. if UA has blood, will consider Ct abd pelvis since prior kidney stones require procedure.

## 2020-02-29 NOTE — ED STATDOCS - PATIENT PORTAL LINK FT
You can access the FollowMyHealth Patient Portal offered by [facility] by registering at the following website: [email or web address]. By joining [employer]’s FollowMyHealth portal, you will also be able to view your health information using other applications (apps) compatible with our system.

## 2020-02-29 NOTE — ED STATDOCS - CARE PROVIDER_API CALL
Ludin Verma)  Urology  [address] [place] [address]  Phone: [phone]  Fax: [phone]  Follow Up Time:

## 2020-02-29 NOTE — ED STATDOCS - OBJECTIVE STATEMENT
Pertinent HPI/PMH/PSH/FHx/SHx and Review of Systems contained within  HPI: 58 y/o female p/w CC dysuria/burning with urination x3 days. Pt also with intermittent back pain x3 days, worse from baseline. Pt taking Cipro for UTI with no relief.   PMH/PSH relevant for: HTN, kidney stone year and a half ago requiring lithotripsy, s/p appy  ROS negative for: fever, Chest pain, SOB, Nausea, vomiting, diarrhea, abdominal pain, hematuria  FamilyHx and SocialHx not otherwise contributory. Nonsmoker. Pertinent HPI/PMH/PSH/FHx/SHx and Review of Systems contained within  HPI: 60 y/o female p/w CC dysuria/burning with urination x3 days. Pt taking Cipro for UTI with no relief. also experiencing b/l lumbosacral back pain which pt cannot differentiate whether it is from chronic back pain or similar  to prior kidney stone  PMH/PSH relevant for: HTN, kidney stone year and a half ago requiring lithotripsy, s/p appy  ROS negative for: fever, Chest pain, SOB, Nausea, vomiting, diarrhea, abdominal pain, hematuria  FamilyHx and SocialHx not otherwise contributory. Nonsmoker.

## 2020-02-29 NOTE — ED STATDOCS - NS ED ROS FT
Review of Systems:  	•	CONSTITUTIONAL: no fever  	•	SKIN: no rash  	•	RESPIRATORY: no shortness of breath  	•	CARDIAC: no chest pain, no palpitations  	•	GI:  no abd pain, no nausea, no vomiting, no diarrhea  	•	GENITO-URINARY:  +dysuria; no hematuria    	•	MUSCULOSKELETAL:  +back pain  	•	NEUROLOGIC: no weakness  	•	ALLERGY: no rhinitis  	•	PSYSCHIATRIC: no anxiety

## 2020-02-29 NOTE — ED STATDOCS - PHYSICAL EXAMINATION
*GEN: No acute distress, well appearing; A+O x3   *HEAD: Normocephalic, Atraumatic  *EYES/NOSE: PERRL & EOMI b/l  *THROAT: airway patent, moist mucous membranes  *NECK: Neck supple, no masses  *PULMONARY: CTA b/l, symmetric breath sounds.   *CARDIAC: s1s2, regular rhythm, no Murmur  *ABDOMEN:  Non Tender, Non Distended, soft, no guarding, no rebound, no masses   *BACK: +b/l paralumbar TTP. no CVA tenderness, Normal  spine   *EXTREMITIES: symmetric pulses, 2+ dp & radial pulses, capillary refill < 2 seconds, no cyanosis, no edema   *SKIN: no rash or bruising   *NEUROLOGIC: alert, CN 2-12 intact, moves all 4 extremities, full active & passive ROM in all extremities, normal baseline gait  *PSYCH: insight and judgment nl, memory nl, affect nl, thought nl

## 2020-03-01 PROBLEM — I10 ESSENTIAL (PRIMARY) HYPERTENSION: Chronic | Status: ACTIVE | Noted: 2018-07-11

## 2020-03-01 LAB
CULTURE RESULTS: SIGNIFICANT CHANGE UP
SPECIMEN SOURCE: SIGNIFICANT CHANGE UP

## 2020-03-01 RX ORDER — CEPHALEXIN 500 MG
1 CAPSULE ORAL
Qty: 14 | Refills: 0
Start: 2020-03-01 | End: 2020-03-07

## 2020-03-01 RX ORDER — TAMSULOSIN HYDROCHLORIDE 0.4 MG/1
1 CAPSULE ORAL
Qty: 14 | Refills: 0
Start: 2020-03-01 | End: 2020-03-14

## 2020-03-09 ENCOUNTER — EMERGENCY (EMERGENCY)
Facility: HOSPITAL | Age: 60
LOS: 0 days | Discharge: HOME | End: 2020-03-09
Attending: EMERGENCY MEDICINE
Payer: MEDICAID

## 2020-03-09 VITALS
OXYGEN SATURATION: 96 % | HEART RATE: 67 BPM | TEMPERATURE: 98 F | DIASTOLIC BLOOD PRESSURE: 91 MMHG | RESPIRATION RATE: 18 BRPM | SYSTOLIC BLOOD PRESSURE: 137 MMHG

## 2020-03-09 VITALS — WEIGHT: 179.9 LBS | HEIGHT: 62 IN

## 2020-03-09 DIAGNOSIS — Z87.442 PERSONAL HISTORY OF URINARY CALCULI: ICD-10-CM

## 2020-03-09 DIAGNOSIS — R30.0 DYSURIA: ICD-10-CM

## 2020-03-09 DIAGNOSIS — R10.32 LEFT LOWER QUADRANT PAIN: ICD-10-CM

## 2020-03-09 DIAGNOSIS — I10 ESSENTIAL (PRIMARY) HYPERTENSION: ICD-10-CM

## 2020-03-09 LAB
ALBUMIN SERPL ELPH-MCNC: 4.1 G/DL — SIGNIFICANT CHANGE UP (ref 3.3–5)
ALP SERPL-CCNC: 71 U/L — SIGNIFICANT CHANGE UP (ref 40–120)
ALT FLD-CCNC: 30 U/L — SIGNIFICANT CHANGE UP (ref 12–78)
ANION GAP SERPL CALC-SCNC: 3 MMOL/L — LOW (ref 5–17)
APPEARANCE UR: ABNORMAL
AST SERPL-CCNC: 17 U/L — SIGNIFICANT CHANGE UP (ref 15–37)
BACTERIA # UR AUTO: ABNORMAL
BASOPHILS # BLD AUTO: 0.05 K/UL — SIGNIFICANT CHANGE UP (ref 0–0.2)
BASOPHILS NFR BLD AUTO: 0.8 % — SIGNIFICANT CHANGE UP (ref 0–2)
BILIRUB SERPL-MCNC: 0.3 MG/DL — SIGNIFICANT CHANGE UP (ref 0.2–1.2)
BILIRUB UR-MCNC: NEGATIVE — SIGNIFICANT CHANGE UP
BUN SERPL-MCNC: 19 MG/DL — SIGNIFICANT CHANGE UP (ref 7–23)
CALCIUM SERPL-MCNC: 9.7 MG/DL — SIGNIFICANT CHANGE UP (ref 8.5–10.1)
CHLORIDE SERPL-SCNC: 110 MMOL/L — HIGH (ref 96–108)
CO2 SERPL-SCNC: 28 MMOL/L — SIGNIFICANT CHANGE UP (ref 22–31)
COD CRY URNS QL: ABNORMAL
COLOR SPEC: YELLOW — SIGNIFICANT CHANGE UP
CREAT SERPL-MCNC: 0.76 MG/DL — SIGNIFICANT CHANGE UP (ref 0.5–1.3)
DIFF PNL FLD: ABNORMAL
EOSINOPHIL # BLD AUTO: 0.11 K/UL — SIGNIFICANT CHANGE UP (ref 0–0.5)
EOSINOPHIL NFR BLD AUTO: 1.8 % — SIGNIFICANT CHANGE UP (ref 0–6)
EPI CELLS # UR: SIGNIFICANT CHANGE UP
GLUCOSE SERPL-MCNC: 89 MG/DL — SIGNIFICANT CHANGE UP (ref 70–99)
GLUCOSE UR QL: NEGATIVE MG/DL — SIGNIFICANT CHANGE UP
HCT VFR BLD CALC: 45.8 % — HIGH (ref 34.5–45)
HGB BLD-MCNC: 15.5 G/DL — SIGNIFICANT CHANGE UP (ref 11.5–15.5)
IMM GRANULOCYTES NFR BLD AUTO: 0.3 % — SIGNIFICANT CHANGE UP (ref 0–1.5)
KETONES UR-MCNC: NEGATIVE — SIGNIFICANT CHANGE UP
LEUKOCYTE ESTERASE UR-ACNC: ABNORMAL
LYMPHOCYTES # BLD AUTO: 2.12 K/UL — SIGNIFICANT CHANGE UP (ref 1–3.3)
LYMPHOCYTES # BLD AUTO: 34.1 % — SIGNIFICANT CHANGE UP (ref 13–44)
MCHC RBC-ENTMCNC: 29.9 PG — SIGNIFICANT CHANGE UP (ref 27–34)
MCHC RBC-ENTMCNC: 33.8 GM/DL — SIGNIFICANT CHANGE UP (ref 32–36)
MCV RBC AUTO: 88.4 FL — SIGNIFICANT CHANGE UP (ref 80–100)
MONOCYTES # BLD AUTO: 0.56 K/UL — SIGNIFICANT CHANGE UP (ref 0–0.9)
MONOCYTES NFR BLD AUTO: 9 % — SIGNIFICANT CHANGE UP (ref 2–14)
NEUTROPHILS # BLD AUTO: 3.36 K/UL — SIGNIFICANT CHANGE UP (ref 1.8–7.4)
NEUTROPHILS NFR BLD AUTO: 54 % — SIGNIFICANT CHANGE UP (ref 43–77)
NITRITE UR-MCNC: NEGATIVE — SIGNIFICANT CHANGE UP
PH UR: 5 — SIGNIFICANT CHANGE UP (ref 5–8)
PLATELET # BLD AUTO: 254 K/UL — SIGNIFICANT CHANGE UP (ref 150–400)
POTASSIUM SERPL-MCNC: 3.9 MMOL/L — SIGNIFICANT CHANGE UP (ref 3.5–5.3)
POTASSIUM SERPL-SCNC: 3.9 MMOL/L — SIGNIFICANT CHANGE UP (ref 3.5–5.3)
PROT SERPL-MCNC: 7.4 GM/DL — SIGNIFICANT CHANGE UP (ref 6–8.3)
PROT UR-MCNC: 30 MG/DL
RBC # BLD: 5.18 M/UL — SIGNIFICANT CHANGE UP (ref 3.8–5.2)
RBC # FLD: 12.3 % — SIGNIFICANT CHANGE UP (ref 10.3–14.5)
RBC CASTS # UR COMP ASSIST: >50 /HPF (ref 0–4)
SODIUM SERPL-SCNC: 141 MMOL/L — SIGNIFICANT CHANGE UP (ref 135–145)
SP GR SPEC: 1.03 — HIGH (ref 1.01–1.02)
UROBILINOGEN FLD QL: NEGATIVE MG/DL — SIGNIFICANT CHANGE UP
WBC # BLD: 6.22 K/UL — SIGNIFICANT CHANGE UP (ref 3.8–10.5)
WBC # FLD AUTO: 6.22 K/UL — SIGNIFICANT CHANGE UP (ref 3.8–10.5)
WBC UR QL: SIGNIFICANT CHANGE UP

## 2020-03-09 PROCEDURE — 80053 COMPREHEN METABOLIC PANEL: CPT

## 2020-03-09 PROCEDURE — 96374 THER/PROPH/DIAG INJ IV PUSH: CPT

## 2020-03-09 PROCEDURE — 76770 US EXAM ABDO BACK WALL COMP: CPT

## 2020-03-09 PROCEDURE — 81001 URINALYSIS AUTO W/SCOPE: CPT

## 2020-03-09 PROCEDURE — 76770 US EXAM ABDO BACK WALL COMP: CPT | Mod: 26

## 2020-03-09 PROCEDURE — 99284 EMERGENCY DEPT VISIT MOD MDM: CPT | Mod: 25

## 2020-03-09 PROCEDURE — 85025 COMPLETE CBC W/AUTO DIFF WBC: CPT

## 2020-03-09 PROCEDURE — 36415 COLL VENOUS BLD VENIPUNCTURE: CPT

## 2020-03-09 PROCEDURE — 99284 EMERGENCY DEPT VISIT MOD MDM: CPT

## 2020-03-09 RX ORDER — AZTREONAM 2 G
1 VIAL (EA) INJECTION
Qty: 14 | Refills: 0
Start: 2020-03-09 | End: 2020-03-15

## 2020-03-09 RX ORDER — KETOROLAC TROMETHAMINE 30 MG/ML
15 SYRINGE (ML) INJECTION ONCE
Refills: 0 | Status: DISCONTINUED | OUTPATIENT
Start: 2020-03-09 | End: 2020-03-09

## 2020-03-09 RX ORDER — SODIUM CHLORIDE 9 MG/ML
1000 INJECTION INTRAMUSCULAR; INTRAVENOUS; SUBCUTANEOUS ONCE
Refills: 0 | Status: COMPLETED | OUTPATIENT
Start: 2020-03-09 | End: 2020-03-09

## 2020-03-09 RX ORDER — PHENAZOPYRIDINE HCL 100 MG
1 TABLET ORAL
Qty: 6 | Refills: 0
Start: 2020-03-09

## 2020-03-09 RX ADMIN — Medication 15 MILLIGRAM(S): at 19:04

## 2020-03-09 RX ADMIN — SODIUM CHLORIDE 1000 MILLILITER(S): 9 INJECTION INTRAMUSCULAR; INTRAVENOUS; SUBCUTANEOUS at 19:04

## 2020-03-09 NOTE — ED ADULT NURSE NOTE - NSSUHOSCREENINGYN_ED_ALL_ED
MSW returned call to patient  Patient reported that he has spotty phone service due to where he lives  Patient then reported that he has "Life Alert" for transportation, but later stated that "Life Alert" is his medical alert device  MSW confirmed with patient that he does use [facility]  MSW did advise that [facility] does provide transportation out of county on limited days/times - Tuesdays, Thursdays, and Fridays with appts not able to be scheduled before 10AM and patient must be ready to return by 1PM  With the assistance of Cintia Gonzáles in [place], MSW was able to assist patient in getting an appt to see Dr Jayda Brunson on Tuesday 3/3/20 at 56 in [place]  Cintia Gonzáles will mail patient a card with the appt date/time/location  Patient requested that appt details be added to My Chart, which should happen automatically  Patient is aware that he will need to schedule his own transportation by calling [facility] (which is already familiar with doing)  MSW phoned [facility] at [phone] and spoke with Britany Quintero to explain that patient was being referred to see Dr Jayna España or Dr Milka Quintero stated that a referral would need to be entered, then they review imaging, then the doctor would need to review the case to ensure that patient could be seen there  MSW did advise Britany Quintero that patient has transportation restrictions since he uses  [facility] and they only offer out of county service on Tuesdays, Thursdays, and Fridays with appt times no earlier than 10AM and that patient must be ready to return to [facility] by Roxann stated that she will take down this information and will keep it on file for when he is able to be scheduled with their office  MSW will request neurosurgery referral script to be placed by KIMBER      MSW will be available to patient as needed       (MSW did remove the Unable to Reach letter from the mail since patient did call back ) Yes - the patient is able to be screened

## 2020-03-09 NOTE — ED ADULT NURSE NOTE - NSIMPLEMENTINTERV_GEN_ALL_ED
[FreeTextEntry1] : We have had a thorough discussion regarding his current condition, findings, and treatment options. She would like to proceed with placement of a spinal cord stimulator with thoracic laminectomy. Risks, benefits, and alternatives were discussed at length including but not limited to bleeding, infection, nerve damage, spinal cord injury, paddle migration, hardware failure, pocket pain, lack of pain relief, habituation, , the need for further surgical intervention, and anesthesia complications. She is agreeable and is ready to proceed.  Implemented All Universal Safety Interventions:  Beatrice to call system. Call bell, personal items and telephone within reach. Instruct patient to call for assistance. Room bathroom lighting operational. Non-slip footwear when patient is off stretcher. Physically safe environment: no spills, clutter or unnecessary equipment. Stretcher in lowest position, wheels locked, appropriate side rails in place.

## 2020-03-09 NOTE — ED STATDOCS - OBJECTIVE STATEMENT
58 y/o female with a PMHx of HTN presents to the ED c/o left flank pain x10 days. Pt was seen in ED on 2/29/2020 and was found to have left sided kidney stone. Pt was prescribed Keflex and Flomax which she has been taking but is still having symptoms. +dysuria described as burning. Prior kidney stone required lithotripsy.

## 2020-03-09 NOTE — ED STATDOCS - PROGRESS NOTE DETAILS
signed Amanda Ordaz PA-C Pt seen initially in intake by Dr Medley.   59F c/o 1 episode of diarrhea and dysuria today after 6pm. Pt seen in ED for dysuria on 2/29, had +left UVJ stone on CT which pt notes she was surprised as she wasn't having flank pain. Has had kidney stones in the past, uro Dr Verma. Pt given rx for keflex and flomax. Has not followed up with uro bc she thought she was supposed to wait until she finished the meds. Urine cx was negative but pt had been on some abx from home for 3 days prior to urine collection. No significant findings on labwork today. UA with increased RBCs. No significant findings on sono. Did not repeat CT scan. Pt denies flank pain at this time. Since pt had diarrhea today and only one episode of dysuria, will send rx for abx but recommend pt not take them if she does not have symptoms and if cx is negative.  Advise call uro Dr Verma tomorrow for f/u tomorrow or next day. return precautions given. pt given copy of labwork and imaging results. Pt feeling well at HI, agrees with HI and plan of care. Pt well appearing.  UA unremarkable aside from blood.  prior culture negative.  pt stable for outpatient f/u.  D/c home with strict return precautions and prompt outpatient urology f/u.

## 2020-03-09 NOTE — ED STATDOCS - CARE PROVIDER_API CALL
Ludin Verma)  Urology  [address] [place] [address]  Phone: [phone]  Fax: [phone]  Follow Up Time:

## 2020-03-09 NOTE — ED STATDOCS - NSFOLLOWUPINSTRUCTIONS_ED_ALL_ED_FT
Dysuria  Dysuria is pain or discomfort while urinating. The pain or discomfort may be felt in the part of your body that drains urine from the bladder (urethra) or in the surrounding tissue of the genitals. The pain may also be felt in the groin area, lower abdomen, or lower back. You may have to urinate frequently or have the sudden feeling that you have to urinate (urgency). Dysuria can affect both men and women, but it is more common in women.  Dysuria can be caused by many different things, including:  Urinary tract infection.Kidney stones or bladder stones.Certain sexually transmitted infections (STIs), such as chlamydia.Dehydration.Inflammation of the tissues of the vagina.Use of certain medicines.Use of certain soaps or scented products that cause irritation.Follow these instructions at home:  General instructions     Watch your condition for any changes.Urinate often. Avoid holding urine for long periods of time.After a bowel movement or urination, women should cleanse from front to back, using each tissue only once.Urinate after sexual intercourse.Keep all follow-up visits as told by your health care provider. This is important.If you had any tests done to find the cause of dysuria, it is up to you to get your test results. Ask your health care provider, or the department that is doing the test, when your results will be ready.Eating and drinking        Drink enough fluid to keep your urine pale yellow.Avoid caffeine, tea, and alcohol. They can irritate the bladder and make dysuria worse. In men, alcohol may irritate the prostate.Medicines     Take over-the-counter and prescription medicines only as told by your health care provider.If you were prescribed an antibiotic medicine, take it as told by your health care provider. Do not stop taking the antibiotic even if you start to feel better.Contact a health care provider if:  You have a fever.You develop pain in your back or sides.You have nausea or vomiting.You have blood in your urine.You are not urinating as often as you usually do.Get help right away if:  Your pain is severe and not relieved with medicines.You cannot eat or drink without vomiting.You are confused.You have a rapid heartbeat while at rest.You have shaking or chills.You feel extremely weak.Summary  Dysuria is pain or discomfort while urinating. Many different conditions can lead to dysuria.If you have dysuria, you may have to urinate frequently or have the sudden feeling that you have to urinate (urgency).Watch your condition for any changes. Keep all follow-up visits as told by your health care provider.Make sure that you urinate often and drink enough fluid to keep your urine pale yellow.This information is not intended to replace advice given to you by your health care provider. Make sure you discuss any questions you have with your health care provider.     FOLLOW UP WITH DR HARVEY IN 1-2 DAYS. RETURN TO THE ER FOR ANY WORSENING SYMPTOMS OR NEW CONCERNS. IF YOU ARE NOT HAVING ANY PAIN WITH URINATION DO NOT TAKE THE NEW MEDICINE. IF YOU ARE HAVING PAIN YOU CAN START THE MEDICINE.

## 2020-03-09 NOTE — ED ADULT TRIAGE NOTE - CHIEF COMPLAINT QUOTE
Patient comes in with left sided back pain and burning on urination. Patient was here on 2/29/2020 and dx with kidney stone and UTI. Patient states symptoms haven't improved. Denies fevers.

## 2020-03-09 NOTE — ED STATDOCS - PATIENT PORTAL LINK FT
You can access the FollowMyHealth Patient Portal offered by [facility] by registering at the following website: [email or web address]. By joining [employer]’s FollowMyHealth portal, you will also be able to view your health information using other applications (apps) compatible with our system.

## 2020-03-09 NOTE — ED STATDOCS - CLINICAL SUMMARY MEDICAL DECISION MAKING FREE TEXT BOX
Known kidney stone just proximal to UVJ on 2/29. Given dysuria will eval for UTI in concert with ureterolithiasis. Will provide Toradol, IV fluids, renal ultrasound to eval for worsening hydronephrosis. Dispo pending clinical improvement and labs.

## 2020-03-10 PROBLEM — N20.0 CALCULUS OF KIDNEY: Chronic | Status: ACTIVE | Noted: 2020-02-29

## 2020-05-25 ENCOUNTER — EMERGENCY (EMERGENCY)
Facility: HOSPITAL | Age: 60
LOS: 0 days | Discharge: HOME | End: 2020-05-25
Attending: EMERGENCY MEDICINE
Payer: MEDICAID

## 2020-05-25 VITALS
RESPIRATION RATE: 18 BRPM | TEMPERATURE: 99 F | DIASTOLIC BLOOD PRESSURE: 83 MMHG | SYSTOLIC BLOOD PRESSURE: 144 MMHG | OXYGEN SATURATION: 100 % | HEART RATE: 87 BPM

## 2020-05-25 VITALS — HEIGHT: 66 IN | WEIGHT: 160.06 LBS
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
## 2020-05-25 DIAGNOSIS — I10 ESSENTIAL (PRIMARY) HYPERTENSION: ICD-10-CM

## 2020-05-25 DIAGNOSIS — S80.02XA CONTUSION OF LEFT KNEE, INITIAL ENCOUNTER: ICD-10-CM

## 2020-05-25 DIAGNOSIS — Z87.442 PERSONAL HISTORY OF URINARY CALCULI: ICD-10-CM
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
## 2020-05-25 DIAGNOSIS — M25.562 PAIN IN LEFT KNEE: ICD-10-CM

## 2020-05-25 PROCEDURE — 73562 X-RAY EXAM OF KNEE 3: CPT | Mod: LT

## 2020-05-25 PROCEDURE — 99283 EMERGENCY DEPT VISIT LOW MDM: CPT

## 2020-05-25 PROCEDURE — 73562 X-RAY EXAM OF KNEE 3: CPT | Mod: 26,LT

## 2020-05-25 PROCEDURE — 99283 EMERGENCY DEPT VISIT LOW MDM: CPT | Mod: 25

## 2020-05-25 RX ORDER — OXYCODONE AND ACETAMINOPHEN 5; 325 MG/1; MG/1
1 TABLET ORAL ONCE
Refills: 0 | Status: DISCONTINUED | OUTPATIENT
Start: 2020-05-25 | End: 2020-05-25

## 2020-05-25 RX ADMIN — OXYCODONE AND ACETAMINOPHEN 1 TABLET(S): 5; 325 TABLET ORAL at 11:46

## 2020-05-25 NOTE — ED PROVIDER NOTE - CARE PROVIDER_API CALL
Sincere Neal  ORTHOPAEDIC SURGERY  [address] [place] [address]  Phone: [phone]  Fax: [phone]  Follow Up Time: 7-10 Days

## 2020-05-25 NOTE — ED PROVIDER NOTE - PROGRESS NOTE DETAILS
PA note: Patient presents with left knee injury 5 days ago. Will get Xrays left knee. Percocet for pain (patient has a ride to go home). Reassess. ~Osvaldo Rasmussen PA-C Left knee immobilizer + Crutches provided. ~Osvaldo Rasmussen PA-C PA note: Left knee xrays reviewed in details with patient. Patient re-examined and re-evaluated. Patient feels much better at this time. ED evaluation, Diagnosis and management discussed with the patient in detail. Workup results discussed with ED attending FIORELLA Pillai to [place] home. Close PMD follow up encouraged. ORTHO f/u prn. Strict ED return instructions discussed in detail and patient given the opportunity to ask any questions about their discharge diagnosis and instructions. Patient verbalized understanding. ~ KAZ Cross

## 2020-05-25 NOTE — ED PROVIDER NOTE - OBJECTIVE STATEMENT
PA note: Patient is a 59 year old female with PMHx of kidney stone, HTN on Norvasc who presents to [facility] c/o left knee pain from a fall injury 5 days ago. Patient was exercising at home and she tripped over her shoes, landed on both her knees. No other trauma or injury. No head/neck trauma. Patient c/o progressive left knee pain, worse with weight bearing. No significant relief from Tylenol/Motrin. ~Osvaldo Rasmussen PA-C

## 2020-05-25 NOTE — ED PROVIDER NOTE - MUSCULOSKELETAL, MLM
Spine appears normal, range of motion is not limited. LEFT KNEE: +Mild STS left knee. Minimal ecchymosis anterior left knee. +Moderate tenderness. +Painful ROM. NVI. No obvious bony deformity. 2+ distal pulses. Able to straight raise LLE without difficulty, no signs of quadriceps tendon rupture. Spine appears normal, range of motion is not limited. LEFT KNEE: +Mild STS left knee. Minimal ecchymosis anterior left knee. +Moderate tenderness. +Painful ROM. NVI. No obvious bony deformity. 2+ distal pulses. Able to straight raise LLE without difficulty, no signs of quadriceps tendon rupture. No calf tenderness or swelling. NEG Colette .NEG Jeovany.

## 2020-05-25 NOTE — ED PROVIDER NOTE - CLINICAL SUMMARY MEDICAL DECISION MAKING FREE TEXT BOX
PA note: Left knee xrays reviewed in details with patient. Patient re-examined and re-evaluated. Patient feels much better at this time. ED evaluation, Diagnosis and management discussed with the patient in detail. Workup results discussed with ED attending FIORELLA Pillai to [place] home. Close PMD follow up encouraged. ORTHO f/u prn. Strict ED return instructions discussed in detail and patient given the opportunity to ask any questions about their discharge diagnosis and instructions. Patient verbalized understanding. ~ KAZ Cross PA note: Left knee xrays reviewed in details with patient. Patient re-examined and re-evaluated. Patient feels much better at this time. ED evaluation, Diagnosis and management discussed with the patient in detail. Workup results discussed with ED attending FIORELLA Pillai to [place] home. Close PMD follow up encouraged. ORTHO f/u prn. Strict ED return instructions discussed in detail and patient given the opportunity to ask any questions about their discharge diagnosis and instructions. Patient verbalized understanding. ~ Osvaldo Rasmussen PA-C

## 2020-05-25 NOTE — ED PROVIDER NOTE - ATTENDING CONTRIBUTION TO CARE
Attending Attestation:  Walker Snell MD.  I have personally performed a history and physical examination of the patient . I have discussed management with the ACP & the patient.  I reviewed the ACP's note and agree with the documented findings and plan of care.  I have authored and modified critical sections of the Provider Note, including but not limited to HPI, ROS, Physical Exam and MDM.

## 2020-05-25 NOTE — ED ADULT NURSE NOTE - OBJECTIVE STATEMENT
Patient presents with left knee pain from a fall 5 days ago. Patient states she was exercising and tripped. Patient has been taking tylenol / motrin at home

## 2020-05-25 NOTE — ED PROVIDER NOTE - NSFOLLOWUPINSTRUCTIONS_ED_ALL_ED_FT
Contusion  A contusion is a deep bruise. Contusions are the result of a blunt injury to tissues and muscle fibers under the skin. The injury causes bleeding under the skin. The skin overlying the contusion may turn blue, purple, or yellow. Minor injuries will give you a painless contusion, but more severe injuries cause contusions that may stay painful and swollen for a few weeks.  Follow these instructions at home:  Pay attention to any changes in your symptoms. Let your health care provider know about them. Take these actions to relieve your pain.  Managing pain, stiffness, and swelling        Use resting, icing, applying pressure (compression), and raising (elevating) the injured area. This is often called the RICE strategy.  Rest the injured area. Return to your normal activities as told by your health care provider. Ask your health care provider what activities are safe for you.If directed, put ice on the injured area:  Put ice in a plastic bag.Place a towel between your skin and the bag.Leave the ice on for 20 minutes, 2–3 times per day.If directed, apply light compression to the injured area using an elastic bandage. Make sure the bandage is not wrapped too tightly. Remove and reapply the bandage as directed by your health care provider.If possible, raise (elevate) the injured area above the level of your heart while you are sitting or lying down.General instructions     Take over-the-counter and prescription medicines only as told by your health care provider.Keep all follow-up visits as told by your health care provider. This is important.Contact a health care provider if:  Your symptoms do not improve after several days of treatment.Your symptoms get worse.You have difficulty moving the injured area.Get help right away if:  You have severe pain.You have numbness in a hand or foot.Your hand or foot turns pale or cold.Summary  A contusion is a deep bruise.Contusions are the result of a blunt injury to tissues and muscle fibers under the skin.It is treated with rest, ice, compression, and elevation. You may be given over-the-counter medicines for pain.Contact a health care provider if your symptoms do not improve, or get worse. Get help right away if you have severe pain, have numbness, or the area turns pale or cold.This information is not intended to replace advice given to you by your health care provider. Make sure you discuss any questions you have with your health care provider.

## 2020-05-25 NOTE — ED PROVIDER NOTE - PATIENT PORTAL LINK FT
You can access the FollowMyHealth Patient Portal offered by [facility] by registering at the following website: [email or web address]. By joining [employer]’s FollowMyHealth portal, you will also be able to view your health information using other applications (apps) compatible with our system.

## 2020-10-19 NOTE — PATIENT PROFILE ADULT. - FUNCTIONAL SCREEN CURRENT LEVEL: AMBULATION, MLM
.  
Caller: Chelle Mannie De La Cruz Fabio    Relationship: Self    Best call back number:[phone]     Medication needed:   Requested Prescriptions     Pending Prescriptions Disp Refills   • calcium carbonate, oyster shell, 500 MG tablet tablet 30 each 11     Sig: Take 1 tablet by mouth Daily.       When do you need the refill by: TODAY    What details did the patient provide when requesting the medication: PT STATED THAT HE ONLY RECEIVED 30 PILL AND IT WAS SUPPOSE TO BE 60.      Does the patient have less than a 3 day supply:  [x] Yes  [] No    What is the patient's preferred pharmacy: [id number] - [place], [place] - [address] - [phone] [facility] [phone] FX         
(0) independent

## 2023-06-15 ENCOUNTER — OFFICE (OUTPATIENT)
Dept: URBAN - METROPOLITAN AREA CLINIC 12 | Facility: CLINIC | Age: 63
Setting detail: OPHTHALMOLOGY
End: 2023-06-15

## 2023-06-15 DIAGNOSIS — H52.7: ICD-10-CM

## 2023-06-15 DIAGNOSIS — H25.13: ICD-10-CM

## 2023-06-15 DIAGNOSIS — H16.223: ICD-10-CM

## 2023-06-15 PROCEDURE — 92004 COMPRE OPH EXAM NEW PT 1/>: CPT | Performed by: STUDENT IN AN ORGANIZED HEALTH CARE EDUCATION/TRAINING PROGRAM

## 2023-06-15 PROCEDURE — 92015 DETERMINE REFRACTIVE STATE: CPT | Performed by: STUDENT IN AN ORGANIZED HEALTH CARE EDUCATION/TRAINING PROGRAM

## 2023-06-15 ASSESSMENT — AXIALLENGTH_DERIVED
OD_AL: 22.8155
OD_AL: 23.0921
OD_AL: 22.907
OS_AL: 22.7248
OS_AL: 22.6348

## 2023-06-15 ASSESSMENT — SPHEQUIV_DERIVED
OD_SPHEQUIV: 1
OS_SPHEQUIV: 1.5
OD_SPHEQUIV: 0.25
OD_SPHEQUIV: 0.75
OS_SPHEQUIV: 1.25

## 2023-06-15 ASSESSMENT — REFRACTION_MANIFEST
OD_AXIS: 125
OD_ADD: +2.50
OS_SPHERE: +1.50
OD_CYLINDER: -0.50
OD_SPHERE: +0.50
OD_AXIS: 135
OS_AXIS: 075
OS_VA1: 20/20
OS_ADD: +2.50
OD_SPHERE: +1.00
OD_VA1: 20/25
OS_VA1: 20/20-1
OS_SPHERE: +0.75
OD_ADD: +2.00
OS_CYLINDER: -0.50
OD_CYLINDER: -0.50
OD_VA1: 20/20
OS_CYLINDER: SPHERE
OS_ADD: +2.00
OS_AXIS: 180

## 2023-06-15 ASSESSMENT — KERATOMETRY
OS_AXISANGLE_DEGREES: 100
METHOD_AUTO_MANUAL: AUTO
OD_K1POWER_DIOPTERS: 44.00
OS_K1POWER_DIOPTERS: 44.50
OD_AXISANGLE_DEGREES: 084
OS_K2POWER_DIOPTERS: 44.75
OD_K2POWER_DIOPTERS: 45.25

## 2023-06-15 ASSESSMENT — DECREASING TEAR LAKE - SEVERITY SCORE
OD_DEC_TEARLAKE: T
OS_DEC_TEARLAKE: T

## 2023-06-15 ASSESSMENT — TONOMETRY
OD_IOP_MMHG: 12
OS_IOP_MMHG: 13

## 2023-06-15 ASSESSMENT — REFRACTION_AUTOREFRACTION
OD_SPHERE: +1.25
OS_CYLINDER: -0.50
OS_AXIS: 071
OS_SPHERE: +1.75
OD_AXIS: 128
OD_CYLINDER: -0.50

## 2023-06-15 ASSESSMENT — REFRACTION_CURRENTRX
OD_VPRISM_DIRECTION: SV
OS_OVR_VA: 20/
OD_SPHERE: +2.75
OS_SPHERE: +2.75
OS_VPRISM_DIRECTION: SV
OD_OVR_VA: 20/

## 2023-06-15 ASSESSMENT — VISUAL ACUITY
OS_BCVA: 20/20-2
OD_BCVA: 20/70

## 2023-06-15 ASSESSMENT — CONFRONTATIONAL VISUAL FIELD TEST (CVF)
OS_FINDINGS: FULL
OD_FINDINGS: FULL

## 2024-05-19 ENCOUNTER — EMERGENCY (EMERGENCY)
Facility: HOSPITAL | Age: 64
LOS: 0 days | Discharge: HOME | End: 2024-05-19
Attending: STUDENT IN AN ORGANIZED HEALTH CARE EDUCATION/TRAINING PROGRAM
Payer: SELF-PAY

## 2024-05-19 VITALS
TEMPERATURE: 98 F | SYSTOLIC BLOOD PRESSURE: 136 MMHG | WEIGHT: 200.84 LBS | DIASTOLIC BLOOD PRESSURE: 84 MMHG | RESPIRATION RATE: 18 BRPM | HEART RATE: 79 BPM | OXYGEN SATURATION: 100 %

## 2024-05-19 DIAGNOSIS — N20.1 CALCULUS OF URETER: ICD-10-CM

## 2024-05-19 DIAGNOSIS — Z87.442 PERSONAL HISTORY OF URINARY CALCULI: ICD-10-CM

## 2024-05-19 DIAGNOSIS — R35.0 FREQUENCY OF MICTURITION: ICD-10-CM

## 2024-05-19 DIAGNOSIS — R10.9 UNSPECIFIED ABDOMINAL PAIN: ICD-10-CM

## 2024-05-19 LAB
ALBUMIN SERPL ELPH-MCNC: 4 G/DL — SIGNIFICANT CHANGE UP (ref 3.3–5)
ALP SERPL-CCNC: 80 U/L — SIGNIFICANT CHANGE UP (ref 40–120)
ALT FLD-CCNC: 45 U/L — SIGNIFICANT CHANGE UP (ref 12–78)
ANION GAP SERPL CALC-SCNC: 5 MMOL/L — SIGNIFICANT CHANGE UP (ref 5–17)
APPEARANCE UR: CLEAR — SIGNIFICANT CHANGE UP
AST SERPL-CCNC: 26 U/L — SIGNIFICANT CHANGE UP (ref 15–37)
BACTERIA # UR AUTO: NEGATIVE /HPF — SIGNIFICANT CHANGE UP
BASOPHILS # BLD AUTO: 0.08 K/UL — SIGNIFICANT CHANGE UP (ref 0–0.2)
BASOPHILS NFR BLD AUTO: 0.9 % — SIGNIFICANT CHANGE UP (ref 0–2)
BILIRUB SERPL-MCNC: 0.3 MG/DL — SIGNIFICANT CHANGE UP (ref 0.2–1.2)
BILIRUB UR-MCNC: NEGATIVE — SIGNIFICANT CHANGE UP
BUN SERPL-MCNC: 26 MG/DL — HIGH (ref 7–23)
CALCIUM SERPL-MCNC: 10 MG/DL — SIGNIFICANT CHANGE UP (ref 8.5–10.1)
CAST: 2 /LPF — SIGNIFICANT CHANGE UP (ref 0–4)
CHLORIDE SERPL-SCNC: 111 MMOL/L — HIGH (ref 96–108)
CO2 SERPL-SCNC: 28 MMOL/L — SIGNIFICANT CHANGE UP (ref 22–31)
COLOR SPEC: YELLOW — SIGNIFICANT CHANGE UP
COMMENT - URINE: SIGNIFICANT CHANGE UP
CREAT SERPL-MCNC: 0.82 MG/DL — SIGNIFICANT CHANGE UP (ref 0.5–1.3)
DIFF PNL FLD: ABNORMAL
EGFR: 80 ML/MIN/1.73M2 — SIGNIFICANT CHANGE UP
EOSINOPHIL # BLD AUTO: 0.2 K/UL — SIGNIFICANT CHANGE UP (ref 0–0.5)
EOSINOPHIL NFR BLD AUTO: 2.2 % — SIGNIFICANT CHANGE UP (ref 0–6)
GLUCOSE SERPL-MCNC: 114 MG/DL — HIGH (ref 70–99)
GLUCOSE UR QL: NEGATIVE MG/DL — SIGNIFICANT CHANGE UP
HCT VFR BLD CALC: 48 % — HIGH (ref 34.5–45)
HGB BLD-MCNC: 16 G/DL — HIGH (ref 11.5–15.5)
IMM GRANULOCYTES NFR BLD AUTO: 0.2 % — SIGNIFICANT CHANGE UP (ref 0–0.9)
KETONES UR-MCNC: NEGATIVE MG/DL — SIGNIFICANT CHANGE UP
LEUKOCYTE ESTERASE UR-ACNC: ABNORMAL
LIDOCAIN IGE QN: 45 U/L — SIGNIFICANT CHANGE UP (ref 13–75)
LYMPHOCYTES # BLD AUTO: 3.55 K/UL — HIGH (ref 1–3.3)
LYMPHOCYTES # BLD AUTO: 39.5 % — SIGNIFICANT CHANGE UP (ref 13–44)
MCHC RBC-ENTMCNC: 30 PG — SIGNIFICANT CHANGE UP (ref 27–34)
MCHC RBC-ENTMCNC: 33.3 GM/DL — SIGNIFICANT CHANGE UP (ref 32–36)
MCV RBC AUTO: 90.1 FL — SIGNIFICANT CHANGE UP (ref 80–100)
MONOCYTES # BLD AUTO: 1 K/UL — HIGH (ref 0–0.9)
MONOCYTES NFR BLD AUTO: 11.1 % — SIGNIFICANT CHANGE UP (ref 2–14)
NEUTROPHILS # BLD AUTO: 4.14 K/UL — SIGNIFICANT CHANGE UP (ref 1.8–7.4)
NEUTROPHILS NFR BLD AUTO: 46.1 % — SIGNIFICANT CHANGE UP (ref 43–77)
NITRITE UR-MCNC: NEGATIVE — SIGNIFICANT CHANGE UP
PH UR: 5.5 — SIGNIFICANT CHANGE UP (ref 5–8)
PLATELET # BLD AUTO: 289 K/UL — SIGNIFICANT CHANGE UP (ref 150–400)
POTASSIUM SERPL-MCNC: 4.4 MMOL/L — SIGNIFICANT CHANGE UP (ref 3.5–5.3)
POTASSIUM SERPL-SCNC: 4.4 MMOL/L — SIGNIFICANT CHANGE UP (ref 3.5–5.3)
PROT SERPL-MCNC: 7.7 GM/DL — SIGNIFICANT CHANGE UP (ref 6–8.3)
PROT UR-MCNC: SIGNIFICANT CHANGE UP MG/DL
RBC # BLD: 5.33 M/UL — HIGH (ref 3.8–5.2)
RBC # FLD: 12.6 % — SIGNIFICANT CHANGE UP (ref 10.3–14.5)
RBC CASTS # UR COMP ASSIST: 6 /HPF — HIGH (ref 0–4)
SODIUM SERPL-SCNC: 144 MMOL/L — SIGNIFICANT CHANGE UP (ref 135–145)
SP GR SPEC: 1.03 — SIGNIFICANT CHANGE UP (ref 1–1.03)
SQUAMOUS # UR AUTO: 2 /HPF — SIGNIFICANT CHANGE UP (ref 0–5)
UROBILINOGEN FLD QL: 1 MG/DL — SIGNIFICANT CHANGE UP (ref 0.2–1)
WBC # BLD: 8.99 K/UL — SIGNIFICANT CHANGE UP (ref 3.8–10.5)
WBC # FLD AUTO: 8.99 K/UL — SIGNIFICANT CHANGE UP (ref 3.8–10.5)
WBC UR QL: 12 /HPF — HIGH (ref 0–5)

## 2024-05-19 PROCEDURE — 74177 CT ABD & PELVIS W/CONTRAST: CPT | Mod: MC

## 2024-05-19 PROCEDURE — 36415 COLL VENOUS BLD VENIPUNCTURE: CPT

## 2024-05-19 PROCEDURE — 74177 CT ABD & PELVIS W/CONTRAST: CPT | Mod: 26,MC

## 2024-05-19 PROCEDURE — 80053 COMPREHEN METABOLIC PANEL: CPT

## 2024-05-19 PROCEDURE — 99284 EMERGENCY DEPT VISIT MOD MDM: CPT | Mod: 25

## 2024-05-19 PROCEDURE — 87086 URINE CULTURE/COLONY COUNT: CPT

## 2024-05-19 PROCEDURE — 85025 COMPLETE CBC W/AUTO DIFF WBC: CPT

## 2024-05-19 PROCEDURE — 96375 TX/PRO/DX INJ NEW DRUG ADDON: CPT

## 2024-05-19 PROCEDURE — 99285 EMERGENCY DEPT VISIT HI MDM: CPT

## 2024-05-19 PROCEDURE — 83690 ASSAY OF LIPASE: CPT

## 2024-05-19 PROCEDURE — 96374 THER/PROPH/DIAG INJ IV PUSH: CPT | Mod: XU

## 2024-05-19 PROCEDURE — 81001 URINALYSIS AUTO W/SCOPE: CPT

## 2024-05-19 RX ORDER — TAMSULOSIN HYDROCHLORIDE 0.4 MG/1
1 CAPSULE ORAL
Qty: 14 | Refills: 0
Start: 2024-05-19 | End: 2024-06-01

## 2024-05-19 RX ORDER — ONDANSETRON 8 MG/1
1 TABLET, FILM COATED ORAL
Qty: 20 | Refills: 0
Start: 2024-05-19

## 2024-05-19 RX ORDER — OXYCODONE AND ACETAMINOPHEN 5; 325 MG/1; MG/1
1 TABLET ORAL
Qty: 16 | Refills: 0
Start: 2024-05-19

## 2024-05-19 RX ORDER — KETOROLAC TROMETHAMINE 30 MG/ML
30 SYRINGE (ML) INJECTION ONCE
Refills: 0 | Status: DISCONTINUED | OUTPATIENT
Start: 2024-05-19 | End: 2024-05-19

## 2024-05-19 RX ORDER — CEFPODOXIME PROXETIL 100 MG
1 TABLET ORAL
Qty: 14 | Refills: 0
Start: 2024-05-19 | End: 2024-05-25

## 2024-05-19 RX ORDER — SODIUM CHLORIDE 9 MG/ML
1000 INJECTION INTRAMUSCULAR; INTRAVENOUS; SUBCUTANEOUS ONCE
Refills: 0 | Status: COMPLETED | OUTPATIENT
Start: 2024-05-19 | End: 2024-05-19

## 2024-05-19 RX ORDER — CEFTRIAXONE 500 MG/1
1000 INJECTION, POWDER, FOR SOLUTION INTRAMUSCULAR; INTRAVENOUS ONCE
Refills: 0 | Status: COMPLETED | OUTPATIENT
Start: 2024-05-19 | End: 2024-05-19

## 2024-05-19 RX ADMIN — Medication 30 MILLIGRAM(S): at 18:20

## 2024-05-19 RX ADMIN — SODIUM CHLORIDE 1000 MILLILITER(S): 9 INJECTION INTRAMUSCULAR; INTRAVENOUS; SUBCUTANEOUS at 18:20

## 2024-05-19 RX ADMIN — CEFTRIAXONE 1000 MILLIGRAM(S): 500 INJECTION, POWDER, FOR SOLUTION INTRAMUSCULAR; INTRAVENOUS at 20:55

## 2024-05-19 NOTE — ED ADULT NURSE NOTE - NSFALLASSESSNEED_ED_ALL_ED
PRIMARY DIAGNOSIS: SYNCOPE/TIA  OUTPATIENT/OBSERVATION GOALS TO BE MET BEFORE DISCHARGE:  1. Orthostatic performed: N/A    2. Diagnostic testing complete & at baseline neurologic testing: Yes    3. Cleared by consultants (if involved): Yes    4. Interpretation of cardiac rhythm per telemetry tech: Paced    5. Tolerating adequate PO diet and medications: Yes    6. Return to near baseline physical activity or neurologic status: Yes    Discharge Planner Nurse   Safe discharge environment identified: Yes  Barriers to discharge: No       Entered by: Eunice Jessica RN 08/18/2022 12:48 PM     Please review provider order for any additional goals.   Nurse to notify provider when observation goals have been met and patient is ready for discharge.  Eunice Jessica RN on 8/18/2022 at 12:48 PM     no

## 2024-05-19 NOTE — ED STATDOCS - OBJECTIVE STATEMENT
62 y/o female with PMHx of kidney stones presents to the ED left flank pain, urinary frequency that started earlier today. Pt denies fevers ,chills, vomiting, hematuria 62 y/o female with PMHx of kidney stones presents to the ED left flank pain, urinary frequency that started earlier today. Pt denies fevers ,chills, vomiting, hematuria. Pt took Tylenol with improvement of pain.

## 2024-05-19 NOTE — ED ADULT NURSE NOTE - OBJECTIVE STATEMENT
Pt presents to ed c/o urinary urgency, frequency and flank pain starting today. Pt denies painful urination, blood in urine, fever chills nausea vomiting.

## 2024-05-19 NOTE — ED ADULT NURSE NOTE - DISCHARGE DATE/TIME
Appearance:  alert, well appearing, and in no acute distress  Mental status: oriented to person, place, and time with normal affect  Head:  normocephalic, atraumatic. Eye: no icterus, redness, pupils equal and reactive, extraocular eye movements intact, conjunctiva clear  Ear: normal external ear, no discharge, hearing intact  Nose:  no drainage noted  Mouth: mucous membranes moist  Neck: supple, no carotid bruits, thyroid not palpable  Lungs: Bilateral equal air entry, clear to ausculation, no wheezing, rales or rhonchi, normal effort  Cardiovascular: normal rate, regular rhythm, no murmur, gallop, rub.   Abdomen: Soft, nontender, nondistended, normal bowel sounds, no hepatomegaly or splenomegaly  Neurologic: There are no new focal motor or sensory deficits, normal muscle tone and bulk, no abnormal sensation, normal speech, cranial nerves II through XII grossly intact  Skin: No gross lesions, rashes, bruising or bleeding on exposed skin area  Extremities:  peripheral pulses palpable, no pedal edema or calf pain with palpation      Investigations:      Laboratory Testing:  Recent Results (from the past 24 hour(s))   POC Glucose Fingerstick    Collection Time: 05/07/21  4:51 PM   Result Value Ref Range    POC Glucose 209 (H) 75 - 110 mg/dL   POC Glucose Fingerstick    Collection Time: 05/07/21  9:04 PM   Result Value Ref Range    POC Glucose 306 (H) 75 - 110 mg/dL   POC Glucose Fingerstick    Collection Time: 05/08/21  7:34 AM   Result Value Ref Range    POC Glucose 151 (H) 75 - 110 mg/dL   POC Glucose Fingerstick    Collection Time: 05/08/21 11:42 AM   Result Value Ref Range    POC Glucose 246 (H) 75 - 110 mg/dL           Consultations:   IP CONSULT TO INTERNAL MEDICINE  Assessment :      Primary Problem  MDD (major depressive disorder), recurrent, severe, with psychosis ([facility] [facility]    Active Hospital Problems    Diagnosis Date Noted    DM type 2, uncontrolled, with neuropathy ([facility] [E11.40, E11.65] 05/05/2021    Paranoid schizophrenia ([facility] [F20.0] 05/05/2021    MDD (major depressive disorder), recurrent, severe, with psychosis ([facility] [F33.3] 05/05/2021       Plan:     Uncontrolled diabetes mellitus increase Lantus to 30  twice a day  Microscopic hematuria noted us renal no RCC  Obesity class I BMI 31        Bruna Chisholm MD  5/8/2021  2:09 PM    Copy sent to Dr. Helena Rg primary care provider on file. Please note that this chart was generated using voice recognition Dragon dictation software. Although every effort was made to ensure the accuracy of this automated transcription, some errors in transcription may have occurred. 19-May-2024 21:04

## 2024-05-19 NOTE — ED STATDOCS - PROGRESS NOTE DETAILS
64 y/o F with PMH of kidney stones presents with left sided flank pain and urinary frequency today. Denies fever, chills, nausea, vomiting. PE: Well appearing. Cardiac: s1s2, RRR. Lungs: CTAB. Abdomen: NBS x4 soft, nontender. No CVAT. A/P: r/o kidney stone, plan for labs, CT, analgesia, reassess. - Ludin Bauer PA-C Labs and imaging reviewed with patient. 4mm left ureter stone. Pt follows with Dr. Verma. Loi dc with medical management. - Ludin Bauer PA-C

## 2024-05-19 NOTE — ED STATDOCS - ATTENDING APP SHARED VISIT CONTRIBUTION OF CARE
I, Sammy Pittman, DO personally saw the patient with CINDY.  I have personally performed a face to face diagnostic evaluation on this patient.  I have reviewed the CINDY note and agree with the history, exam, and plan of care, except as noted.  I personally saw the patient and performed a substantive portion of the visit including all aspects of the medical decision making.

## 2024-05-19 NOTE — ED STATDOCS - CARE PROVIDER_API CALL
Ludin Verma.  Urology  [facility] [place] [address] [place] [address]  Phone: [phone]  Follow Up Time:

## 2024-05-19 NOTE — ED STATDOCS - PATIENT PORTAL LINK FT
You can access the FollowMyHealth Patient Portal offered by [facility] by registering at the following website: [email or web address]. By joining [employer]’s FollowMyHealth portal, you will also be able to view your health information using other applications (apps) compatible with our system.

## 2024-05-19 NOTE — ED STATDOCS - CLINICAL SUMMARY MEDICAL DECISION MAKING FREE TEXT BOX
Patient presents to the ED flank pain and urinary symptoms, VSS, will check labs, UA, CT abdomen pelvis, give IV fluids, pain meds, reassess

## 2024-05-20 LAB
CULTURE RESULTS: SIGNIFICANT CHANGE UP
SPECIMEN SOURCE: SIGNIFICANT CHANGE UP

## 2024-06-17 ASSESSMENT — REFRACTION_MANIFEST
OS_CYLINDER: -0.50
OD_CYLINDER: -0.50
OS_VA1: 20/20
OS_ADD: +2.50
OS_SPHERE: +0.75
OD_SPHERE: +1.00
OD_ADD: +2.50
OD_SPHERE: +0.50
OD_ADD: +2.00
OS_VA1: 20/20-1
OS_AXIS: 180
OS_SPHERE: +1.50
OS_ADD: +2.00
OD_AXIS: 125
OD_VA1: 20/20
OD_AXIS: 135
OS_CYLINDER: SPHERE
OD_VA1: 20/25
OD_CYLINDER: -0.50
OS_AXIS: 075

## 2024-06-17 ASSESSMENT — KERATOMETRY
OS_K1POWER_DIOPTERS: 44.50
OD_AXISANGLE_DEGREES: 084
OD_K1POWER_DIOPTERS: 44.00
OS_AXISANGLE_DEGREES: 100
METHOD_AUTO_MANUAL: AUTO
OD_K2POWER_DIOPTERS: 45.25
OS_K2POWER_DIOPTERS: 44.75

## 2024-06-17 ASSESSMENT — REFRACTION_CURRENTRX
OD_SPHERE: +2.75
OD_VPRISM_DIRECTION: SV
OS_OVR_VA: 20/
OS_SPHERE: +2.75
OS_VPRISM_DIRECTION: SV
OD_OVR_VA: 20/

## 2024-06-25 ENCOUNTER — OFFICE (OUTPATIENT)
Dept: URBAN - METROPOLITAN AREA CLINIC 12 | Facility: CLINIC | Age: 64
Setting detail: OPHTHALMOLOGY
End: 2024-06-25

## 2024-06-25 DIAGNOSIS — H16.223: ICD-10-CM

## 2024-06-25 DIAGNOSIS — H25.13: ICD-10-CM

## 2024-06-25 PROCEDURE — 99212 OFFICE O/P EST SF 10 MIN: CPT | Performed by: OPTOMETRIST

## 2024-06-25 ASSESSMENT — CONFRONTATIONAL VISUAL FIELD TEST (CVF)
OS_FINDINGS: FULL
OD_FINDINGS: FULL

## 2024-08-20 NOTE — ED ADULT TRIAGE NOTE - GLASGOW COMA SCALE: EYE OPENING, MLM
(E4) spontaneous NEPHROLOGY - NSN    Patient seen and examined.    HPI:  87 y/o F with PMH of DM2, HTN, HLD, CVA/TIA, CKD, and pulmonary sarcoidosis, presents c/o productive cough x 3 days. Patient was recently admitted to the hospital last week for  diarrhea. She was discharged this past Friday with improvement in her breathing and did not need oxygen upon discharge. On Saturday she developed a productive cough that has been persistent since that time so family decided to do an at home covid test today that came back positive. They called the patients pcp who noted that the patient is not a paxlovid candidate so she was advised to come to the ED instead for further evaluation and care. Denies any other complaints or concerns. 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  No history of NSAIDS or nephrolithisis.  The patient urinates once or twice in the night and there is no incontinence.  No family hx or renal disease or back pain.    No recent abx use.  No alleviating or aggravating factors with respect to the kidneys.      PAST MEDICAL & SURGICAL HISTORY:  HTN (hypertension)      ARF (acute renal failure)  with glomerulonephritis      Anemia      Ectopic fetus      Sarcoidosis of lung      HLD (hyperlipidemia)      Hernia  as a child      Mass  calcified mass left hip area, 2011      Murmur      CVA (cerebral vascular accident)      TIA (transient ischemic attack)      S/P appendectomy      S/P hysterectomy  with BLSO      S/P knee surgery  for right torn meniscual      S/P cholecystectomy  11/2015      H/O cataract extraction, right  with laser revision 3/2016          MEDICATIONS  (STANDING):  remdesivir  IVPB   IV Intermittent       Allergies    IV Contrast (Hives; Rash)  sulfa drugs (Hives)  codeine (Pruritus)  shellfish (Hives)  iodine (Hives)  morphine (Pruritus)    Intolerances        SOCIAL HISTORY:  Denies alcohol abuse, drug abuse or tobacco usage.     FAMILY HISTORY:  Type 2 diabetes mellitus (Sibling)        VITALS:  T(C): 38.8 (08-20-24 @ 09:38), Max: 39.1 (08-20-24 @ 00:02)  HR: 102 (08-20-24 @ 09:38) (84 - 102)  BP: 192/113 (08-20-24 @ 09:38) (124/74 - 192/113)  RR: 18 (08-20-24 @ 09:38) (16 - 19)  SpO2: 96% (08-20-24 @ 09:38) (96% - 100%)    REVIEW OF SYSTEMS:   . Denies chest pain, SOB, focal weakness, hematuria or dysuria. + fatigue or weakness. All other pertinent systems are reviewed and are negative.    PHYSICAL EXAM:  Constitutional: NAD  HEENT: EOMI  Neck:  No JVD, supple   Respiratory: CTA B/L  Cardiovascular: S1 and S2, RRR  Gastrointestinal: + BS, soft, NT, ND  Extremities: No peripheral edema, + peripheral pulses  Neurological: A/O x 3, CN2-12 intact  Psychiatric: Normal mood, normal affect  : No Jansen  Skin: No rashes, C/D/I  Access: Not applicable    I and O's:    Height (cm): 154.9 (08-19 @ 18:27)  Weight (kg): 60.8 (08-19 @ 18:27)  BMI (kg/m2): 25.3 (08-19 @ 18:27)  BSA (m2): 1.59 (08-19 @ 18:27)    LABS:                        9.9    9.55  )-----------( 234      ( 19 Aug 2024 23:25 )             31.1     08-20    x   |  x   |  x   ----------------------------<  x   x    |  x   |  1.25    Ca    9.9      19 Aug 2024 23:25    TPro  6.1  /  Alb  3.2<L>  /  TBili  0.3  /  DBili  <0.2  /  AST  23  /  ALT  21  /  AlkPhos  56  08-20      URINE:  Urinalysis Basic - ( 19 Aug 2024 23:25 )    Color: x / Appearance: x / SG: x / pH: x  Gluc: 89 mg/dL / Ketone: x  / Bili: x / Urobili: x   Blood: x / Protein: x / Nitrite: x   Leuk Esterase: x / RBC: x / WBC x   Sq Epi: x / Non Sq Epi: x / Bacteria: x        RADIOLOGY & ADDITIONAL STUDIES:    < from: Xray Chest 2 Views PA/Lat (08.19.24 @ 23:55) >    ACC: [id number] EXAM:  XR CHEST PA LAT 2V   ORDERED BY: LISA LUNDBERG     PROCEDURE DATE:  08/19/2024          INTERPRETATION:  EXAMINATION: XR CHEST PA AND LATERAL    CLINICAL INDICATION: cough    TECHNIQUE: 2 views; Frontal and lateral views of the chest were obtained.    COMPARISON: CT chest 8/11/2024.    FINDINGS:    The heart is normal in size. Loop recorder.  Left mid to lower lung field patchy opacities not significantly changed   from recent chest CT.  No new focal consolidation.  There isno pneumothorax or pleural effusion.  No acute bony abnormality. Severe bilateral shoulder arthrosis.    IMPRESSION: No acute pulmonary disease.      --- End of Report ---          EAMON MUNGUIA DO; Resident Radiologist  This document has been electronically signed.  LILIANA RIVAS MD; Attending Radiologist  This document has been electronically signed. Aug 20 2024  7:51AM    < end of copied text >   NEPHROLOGY - NSN    Patient seen and examined.    HPI:  87 y/o F with PMH of DM2, HTN, HLD, CVA/TIA, CKD, and pulmonary sarcoidosis, presents c/o productive cough x 3 days. Patient was recently admitted to the hospital last week for  diarrhea. She was discharged this past Friday with improvement in her breathing and did not need oxygen upon discharge. On Saturday she developed a productive cough that has been persistent since that time so family decided to do an at home covid test today that came back positive. They called the patients pcp who noted that the patient is not a paxlovid candidate so she was advised to come to the ED instead for further evaluation and care. Denies any other complaints or concerns. 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  No history of NSAIDS or nephrolithisis.  The patient urinates once or twice in the night and there is no incontinence.  No family hx or renal disease or back pain.    No recent abx use.  No alleviating or aggravating factors with respect to the kidneys.    Febrile on admission and still coughing       PAST MEDICAL & SURGICAL HISTORY:  HTN (hypertension)      ARF (acute renal failure)  with glomerulonephritis      Anemia      Ectopic fetus      Sarcoidosis of lung      HLD (hyperlipidemia)      Hernia  as a child      Mass  calcified mass left hip area, 2011      Murmur      CVA (cerebral vascular accident)      TIA (transient ischemic attack)      S/P appendectomy      S/P hysterectomy  with BLSO      S/P knee surgery  for right torn meniscual      S/P cholecystectomy  11/2015      H/O cataract extraction, right  with laser revision 3/2016          MEDICATIONS  (STANDING):  remdesivir  IVPB   IV Intermittent       Allergies    IV Contrast (Hives; Rash)  sulfa drugs (Hives)  codeine (Pruritus)  shellfish (Hives)  iodine (Hives)  morphine (Pruritus)    Intolerances        SOCIAL HISTORY:  Denies alcohol abuse, drug abuse or tobacco usage.     FAMILY HISTORY:  Type 2 diabetes mellitus (Sibling)        VITALS:  T(C): 38.8 (08-20-24 @ 09:38), Max: 39.1 (08-20-24 @ 00:02)  HR: 102 (08-20-24 @ 09:38) (84 - 102)  BP: 192/113 (08-20-24 @ 09:38) (124/74 - 192/113)  RR: 18 (08-20-24 @ 09:38) (16 - 19)  SpO2: 96% (08-20-24 @ 09:38) (96% - 100%)    REVIEW OF SYSTEMS:   . Denies chest pain, SOB, focal weakness, hematuria or dysuria. + fatigue or weakness. All other pertinent systems are reviewed and are negative.    PHYSICAL EXAM:  Constitutional: NAD  HEENT: EOMI  Neck:  No JVD, supple   Respiratory: CTA B/L  Cardiovascular: S1 and S2, RRR  Gastrointestinal: + BS, soft, NT, ND  Extremities: No peripheral edema, + peripheral pulses  Neurological: A/O x 3, CN2-12 intact  Psychiatric: Normal mood, normal affect  : No Jansen  Skin: No rashes, C/D/I  Access: Not applicable    I and O's:    Height (cm): 154.9 (08-19 @ 18:27)  Weight (kg): 60.8 (08-19 @ 18:27)  BMI (kg/m2): 25.3 (08-19 @ 18:27)  BSA (m2): 1.59 (08-19 @ 18:27)    LABS:                        9.9    9.55  )-----------( 234      ( 19 Aug 2024 23:25 )             31.1     08-20    x   |  x   |  x   ----------------------------<  x   x    |  x   |  1.25    Ca    9.9      19 Aug 2024 23:25    TPro  6.1  /  Alb  3.2<L>  /  TBili  0.3  /  DBili  <0.2  /  AST  23  /  ALT  21  /  AlkPhos  56  08-20      URINE:  Urinalysis Basic - ( 19 Aug 2024 23:25 )    Color: x / Appearance: x / SG: x / pH: x  Gluc: 89 mg/dL / Ketone: x  / Bili: x / Urobili: x   Blood: x / Protein: x / Nitrite: x   Leuk Esterase: x / RBC: x / WBC x   Sq Epi: x / Non Sq Epi: x / Bacteria: x        RADIOLOGY & ADDITIONAL STUDIES:    < from: Xray Chest 2 Views PA/Lat (08.19.24 @ 23:55) >    ACC: [id number] EXAM:  XR CHEST PA LAT 2V   ORDERED BY: LISA LUNDBERG     PROCEDURE DATE:  08/19/2024          INTERPRETATION:  EXAMINATION: XR CHEST PA AND LATERAL    CLINICAL INDICATION: cough    TECHNIQUE: 2 views; Frontal and lateral views of the chest were obtained.    COMPARISON: CT chest 8/11/2024.    FINDINGS:    The heart is normal in size. Loop recorder.  Left mid to lower lung field patchy opacities not significantly changed   from recent chest CT.  No new focal consolidation.  There isno pneumothorax or pleural effusion.  No acute bony abnormality. Severe bilateral shoulder arthrosis.    IMPRESSION: No acute pulmonary disease.      --- End of Report ---          EAMON MUNGUIA DO; Resident Radiologist  This document has been electronically signed.  LILIANA RIVAS MD; Attending Radiologist  This document has been electronically signed. Aug 20 2024  7:51AM    < end of copied text >

## 2025-05-25 ENCOUNTER — OFFICE (OUTPATIENT)
Dept: URBAN - METROPOLITAN AREA CLINIC 12 | Facility: CLINIC | Age: 65
Setting detail: OPHTHALMOLOGY
End: 2025-05-25
Payer: COMMERCIAL

## 2025-05-25 DIAGNOSIS — H25.13: ICD-10-CM

## 2025-05-25 DIAGNOSIS — H16.223: ICD-10-CM

## 2025-05-25 DIAGNOSIS — H43.811: ICD-10-CM

## 2025-05-25 PROCEDURE — 92014 COMPRE OPH EXAM EST PT 1/>: CPT | Performed by: STUDENT IN AN ORGANIZED HEALTH CARE EDUCATION/TRAINING PROGRAM

## 2025-05-25 PROCEDURE — 92134 CPTRZ OPH DX IMG PST SGM RTA: CPT | Performed by: STUDENT IN AN ORGANIZED HEALTH CARE EDUCATION/TRAINING PROGRAM

## 2025-05-25 ASSESSMENT — REFRACTION_MANIFEST
OD_ADD: +2.00
OS_SPHERE: +0.75
OD_CYLINDER: -0.50
OD_VA1: 20/20
OS_CYLINDER: -0.50
OS_VA1: 20/20
OD_AXIS: 125
OS_CYLINDER: -0.50
OD_CYLINDER: -0.50
OD_CYLINDER: -0.50
OD_SPHERE: +1.00
OS_VA1: 20/20
OD_AXIS: 125
OD_SPHERE: +0.50
OS_AXIS: 075
OD_ADD: +2.75
OD_SPHERE: +0.75
OD_VA1: 20/20
OS_ADD: +2.75
OS_CYLINDER: SPHERE
OS_SPHERE: +1.00
OS_AXIS: 075
OS_AXIS: 180
OD_VA1: 20/25
OD_ADD: +2.50
OS_SPHERE: +1.50
OD_AXIS: 135
OS_ADD: +2.50
OS_VA1: 20/20-1
OS_ADD: +2.00

## 2025-05-25 ASSESSMENT — REFRACTION_AUTOREFRACTION
OD_CYLINDER: -0.25
OD_SPHERE: +1.25
OD_AXIS: 139
OS_AXIS: 081
OS_CYLINDER: -0.75
OS_SPHERE: +1.50

## 2025-05-25 ASSESSMENT — KERATOMETRY
OS_K2POWER_DIOPTERS: 45.00
METHOD_AUTO_MANUAL: AUTO
OD_AXISANGLE_DEGREES: 087
OS_K1POWER_DIOPTERS: 44.25
OD_K2POWER_DIOPTERS: 45.50
OD_K1POWER_DIOPTERS: 44.00
OS_AXISANGLE_DEGREES: 089

## 2025-05-25 ASSESSMENT — CONFRONTATIONAL VISUAL FIELD TEST (CVF)
OD_FINDINGS: FULL
OS_FINDINGS: FULL

## 2025-05-25 ASSESSMENT — VISUAL ACUITY
OS_BCVA: 20/20-1
OD_BCVA: 20/30-2

## 2025-05-25 ASSESSMENT — TONOMETRY
OS_IOP_MMHG: 13
OD_IOP_MMHG: 13

## 2025-05-25 ASSESSMENT — REFRACTION_CURRENTRX
OS_VPRISM_DIRECTION: SV
OD_OVR_VA: 20/
OD_VPRISM_DIRECTION: SV
OS_OVR_VA: 20/
OD_SPHERE: +2.75
OS_SPHERE: +2.75

## 2025-05-25 ASSESSMENT — DECREASING TEAR LAKE - SEVERITY SCORE
OS_DEC_TEARLAKE: T
OD_DEC_TEARLAKE: T

## 2025-06-16 ENCOUNTER — OFFICE (OUTPATIENT)
Dept: URBAN - METROPOLITAN AREA CLINIC 88 | Facility: CLINIC | Age: 65
Setting detail: OPHTHALMOLOGY
End: 2025-06-16
Payer: COMMERCIAL

## 2025-06-16 DIAGNOSIS — H33.311: ICD-10-CM

## 2025-06-16 DIAGNOSIS — H43.811: ICD-10-CM

## 2025-06-16 PROCEDURE — 67145 PROPH RTA DTCHMNT PC: CPT | Mod: RT | Performed by: OPHTHALMOLOGY

## 2025-06-16 PROCEDURE — 92014 COMPRE OPH EXAM EST PT 1/>: CPT | Mod: 25 | Performed by: OPHTHALMOLOGY

## 2025-06-16 PROCEDURE — 92250 FUNDUS PHOTOGRAPHY W/I&R: CPT | Performed by: OPHTHALMOLOGY

## 2025-06-16 ASSESSMENT — TONOMETRY
OS_IOP_MMHG: 16
OD_IOP_MMHG: 14

## 2025-06-16 ASSESSMENT — REFRACTION_MANIFEST
OS_ADD: +2.50
OD_VA1: 20/20
OS_CYLINDER: SPHERE
OS_VA1: 20/20
OD_VA1: 20/25
OD_SPHERE: +0.75
OS_VA1: 20/20
OD_ADD: +2.50
OS_SPHERE: +1.50
OD_CYLINDER: -0.50
OS_ADD: +2.00
OS_ADD: +2.75
OS_VA1: 20/20-1
OD_AXIS: 125
OD_SPHERE: +0.50
OS_AXIS: 075
OS_SPHERE: +0.75
OD_ADD: +2.75
OS_AXIS: 180
OD_ADD: +2.00
OD_AXIS: 135
OD_AXIS: 125
OD_CYLINDER: -0.50
OD_SPHERE: +1.00
OS_CYLINDER: -0.50
OS_AXIS: 075
OS_SPHERE: +1.00
OS_CYLINDER: -0.50
OD_VA1: 20/20
OD_CYLINDER: -0.50

## 2025-06-16 ASSESSMENT — VISUAL ACUITY
OD_BCVA: 20/30-1
OS_BCVA: 20/20-

## 2025-06-16 ASSESSMENT — KERATOMETRY
OS_K1POWER_DIOPTERS: 44.25
OS_AXISANGLE_DEGREES: 089
OD_K1POWER_DIOPTERS: 44.00
OD_K2POWER_DIOPTERS: 45.50
METHOD_AUTO_MANUAL: AUTO
OD_AXISANGLE_DEGREES: 087
OS_K2POWER_DIOPTERS: 45.00

## 2025-06-16 ASSESSMENT — CONFRONTATIONAL VISUAL FIELD TEST (CVF)
OD_FINDINGS: FULL
OS_FINDINGS: FULL

## 2025-06-16 ASSESSMENT — REFRACTION_CURRENTRX
OS_VPRISM_DIRECTION: SV
OD_OVR_VA: 20/
OD_VPRISM_DIRECTION: SV
OS_OVR_VA: 20/
OS_SPHERE: +2.75
OD_SPHERE: +2.75

## 2025-06-16 ASSESSMENT — DECREASING TEAR LAKE - SEVERITY SCORE
OS_DEC_TEARLAKE: T
OD_DEC_TEARLAKE: T

## 2025-06-16 ASSESSMENT — REFRACTION_AUTOREFRACTION
OS_AXIS: 081
OD_CYLINDER: -0.25
OD_AXIS: 139
OD_SPHERE: +1.25
OS_SPHERE: +1.50
OS_CYLINDER: -0.75

## 2025-06-24 ENCOUNTER — OFFICE (OUTPATIENT)
Dept: URBAN - METROPOLITAN AREA CLINIC 88 | Facility: CLINIC | Age: 65
Setting detail: OPHTHALMOLOGY
End: 2025-06-24
Payer: COMMERCIAL

## 2025-06-24 DIAGNOSIS — H33.311: ICD-10-CM

## 2025-06-24 DIAGNOSIS — H43.811: ICD-10-CM

## 2025-06-24 PROCEDURE — 99024 POSTOP FOLLOW-UP VISIT: CPT | Performed by: OPHTHALMOLOGY

## 2025-06-24 PROCEDURE — 92250 FUNDUS PHOTOGRAPHY W/I&R: CPT | Performed by: OPHTHALMOLOGY

## 2025-06-24 ASSESSMENT — KERATOMETRY
OD_K1POWER_DIOPTERS: 44.00
OD_K2POWER_DIOPTERS: 45.50
OS_K1POWER_DIOPTERS: 44.25
METHOD_AUTO_MANUAL: AUTO
OS_AXISANGLE_DEGREES: 089
OD_AXISANGLE_DEGREES: 087
OS_K2POWER_DIOPTERS: 45.00

## 2025-06-24 ASSESSMENT — CONFRONTATIONAL VISUAL FIELD TEST (CVF)
OS_FINDINGS: FULL
OD_FINDINGS: FULL

## 2025-06-24 ASSESSMENT — DECREASING TEAR LAKE - SEVERITY SCORE
OD_DEC_TEARLAKE: T
OS_DEC_TEARLAKE: T

## 2025-06-24 ASSESSMENT — VISUAL ACUITY
OS_BCVA: 20/30
OD_BCVA: 20/40

## 2025-06-24 ASSESSMENT — REFRACTION_AUTOREFRACTION
OD_SPHERE: +1.25
OD_CYLINDER: -0.25
OS_AXIS: 081
OS_CYLINDER: -0.75
OD_AXIS: 139
OS_SPHERE: +1.50

## 2025-06-24 ASSESSMENT — TONOMETRY
OD_IOP_MMHG: 11
OS_IOP_MMHG: 14

## 2025-06-25 ENCOUNTER — NON-APPOINTMENT (OUTPATIENT)
Age: 65
End: 2025-06-25

## 2025-06-26 ENCOUNTER — APPOINTMENT (OUTPATIENT)
Dept: GASTROENTEROLOGY | Facility: CLINIC | Age: 65
End: 2025-06-26
Payer: MEDICAID

## 2025-06-26 VITALS
BODY MASS INDEX: 36.11 KG/M2 | SYSTOLIC BLOOD PRESSURE: 138 MMHG | WEIGHT: 196.21 LBS | DIASTOLIC BLOOD PRESSURE: 84 MMHG | HEIGHT: 62 IN

## 2025-06-26 PROBLEM — Z12.11 SCREEN FOR COLON CANCER: Status: ACTIVE | Noted: 2025-06-26

## 2025-06-26 PROBLEM — R19.4 FREQUENT BOWEL MOVEMENTS: Status: ACTIVE | Noted: 2025-06-26

## 2025-06-26 PROCEDURE — 99203 OFFICE O/P NEW LOW 30 MIN: CPT

## 2025-06-26 RX ORDER — SODIUM SULFATE, POTASSIUM SULFATE AND MAGNESIUM SULFATE 1.6; 3.13; 17.5 G/177ML; G/177ML; G/177ML
17.5-3.13-1.6 SOLUTION ORAL
Qty: 2 | Refills: 0 | Status: ACTIVE | COMMUNITY
Start: 2025-06-26 | End: 1900-01-01

## 2025-06-26 RX ORDER — AMLODIPINE BESYLATE VALSARTAN HYDROCHLOROTHIAZIDE 10; 12.5; 16 MG/1; MG/1; MG/1
10-160-12.5 TABLET, FILM COATED ORAL
Refills: 0 | Status: ACTIVE | COMMUNITY

## 2025-07-08 ENCOUNTER — RESULT REVIEW (OUTPATIENT)
Age: 65
End: 2025-07-08

## 2025-07-08 ENCOUNTER — APPOINTMENT (OUTPATIENT)
Dept: GASTROENTEROLOGY | Facility: AMBULATORY MEDICAL SERVICES | Age: 65
End: 2025-07-08
Payer: MEDICAID

## 2025-07-08 PROCEDURE — 45380 COLONOSCOPY AND BIOPSY: CPT

## 2025-08-11 ENCOUNTER — OFFICE (OUTPATIENT)
Dept: URBAN - METROPOLITAN AREA CLINIC 88 | Facility: CLINIC | Age: 65
Setting detail: OPHTHALMOLOGY
End: 2025-08-11
Payer: COMMERCIAL

## 2025-08-11 DIAGNOSIS — H33.311: ICD-10-CM

## 2025-08-11 DIAGNOSIS — H43.811: ICD-10-CM

## 2025-08-11 PROCEDURE — 99213 OFFICE O/P EST LOW 20 MIN: CPT | Performed by: OPHTHALMOLOGY

## 2025-08-11 PROCEDURE — 92134 CPTRZ OPH DX IMG PST SGM RTA: CPT | Performed by: OPHTHALMOLOGY

## 2025-08-11 ASSESSMENT — REFRACTION_AUTOREFRACTION
OS_CYLINDER: -0.75
OS_SPHERE: +1.50
OD_AXIS: 139
OS_AXIS: 081
OD_SPHERE: +1.25
OD_CYLINDER: -0.25

## 2025-08-11 ASSESSMENT — DECREASING TEAR LAKE - SEVERITY SCORE
OD_DEC_TEARLAKE: T
OS_DEC_TEARLAKE: T

## 2025-08-11 ASSESSMENT — KERATOMETRY
OS_AXISANGLE_DEGREES: 089
OS_K1POWER_DIOPTERS: 44.25
OD_AXISANGLE_DEGREES: 087
OS_K2POWER_DIOPTERS: 45.00
METHOD_AUTO_MANUAL: AUTO
OD_K1POWER_DIOPTERS: 44.00
OD_K2POWER_DIOPTERS: 45.50

## 2025-08-11 ASSESSMENT — CONFRONTATIONAL VISUAL FIELD TEST (CVF)
OD_FINDINGS: FULL
OS_FINDINGS: FULL

## 2025-08-11 ASSESSMENT — VISUAL ACUITY
OD_BCVA: 20/40
OS_BCVA: 20/25

## 2025-08-11 ASSESSMENT — TONOMETRY
OS_IOP_MMHG: 13
OD_IOP_MMHG: 12

## 2025-08-14 ENCOUNTER — INPATIENT (INPATIENT)
Facility: HOSPITAL | Age: 65
LOS: 0 days | Discharge: HOME | DRG: 465 | End: 2025-08-15
Attending: STUDENT IN AN ORGANIZED HEALTH CARE EDUCATION/TRAINING PROGRAM | Admitting: HOSPITALIST
Payer: MEDICAID

## 2025-08-14 VITALS
SYSTOLIC BLOOD PRESSURE: 148 MMHG | DIASTOLIC BLOOD PRESSURE: 91 MMHG | RESPIRATION RATE: 19 BRPM | WEIGHT: 209 LBS | TEMPERATURE: 98 F | HEART RATE: 88 BPM | OXYGEN SATURATION: 99 %

## 2025-08-14 LAB
ALBUMIN SERPL ELPH-MCNC: 3.9 G/DL — SIGNIFICANT CHANGE UP (ref 3.3–5)
ALP SERPL-CCNC: 92 U/L — SIGNIFICANT CHANGE UP (ref 40–120)
ALT FLD-CCNC: 50 U/L — SIGNIFICANT CHANGE UP (ref 12–78)
ANION GAP SERPL CALC-SCNC: 7 MMOL/L — SIGNIFICANT CHANGE UP (ref 5–17)
APPEARANCE UR: ABNORMAL
APTT BLD: 30.2 SEC — SIGNIFICANT CHANGE UP (ref 26.1–36.8)
AST SERPL-CCNC: 26 U/L — SIGNIFICANT CHANGE UP (ref 15–37)
BACTERIA # UR AUTO: NEGATIVE /HPF — SIGNIFICANT CHANGE UP
BASOPHILS # BLD AUTO: 0.06 K/UL — SIGNIFICANT CHANGE UP (ref 0–0.2)
BASOPHILS NFR BLD AUTO: 0.7 % — SIGNIFICANT CHANGE UP (ref 0–2)
BILIRUB SERPL-MCNC: 0.4 MG/DL — SIGNIFICANT CHANGE UP (ref 0.2–1.2)
BILIRUB UR-MCNC: NEGATIVE — SIGNIFICANT CHANGE UP
BLD GP AB SCN SERPL QL: SIGNIFICANT CHANGE UP
BUN SERPL-MCNC: 22 MG/DL — SIGNIFICANT CHANGE UP (ref 7–23)
CALCIUM SERPL-MCNC: 9.7 MG/DL — SIGNIFICANT CHANGE UP (ref 8.5–10.1)
CAST: 16 /LPF — HIGH (ref 0–4)
CHLORIDE SERPL-SCNC: 107 MMOL/L — SIGNIFICANT CHANGE UP (ref 96–108)
CO2 SERPL-SCNC: 24 MMOL/L — SIGNIFICANT CHANGE UP (ref 22–31)
COD CRY URNS QL: PRESENT
COLOR SPEC: SIGNIFICANT CHANGE UP
COMMENT - URINE: SIGNIFICANT CHANGE UP
CREAT SERPL-MCNC: 0.81 MG/DL — SIGNIFICANT CHANGE UP (ref 0.5–1.3)
DIFF PNL FLD: ABNORMAL
EGFR: 81 ML/MIN/1.73M2 — SIGNIFICANT CHANGE UP
EGFR: 81 ML/MIN/1.73M2 — SIGNIFICANT CHANGE UP
EOSINOPHIL # BLD AUTO: 0.16 K/UL — SIGNIFICANT CHANGE UP (ref 0–0.5)
EOSINOPHIL NFR BLD AUTO: 1.8 % — SIGNIFICANT CHANGE UP (ref 0–6)
EPI CELLS # UR: PRESENT
GLUCOSE SERPL-MCNC: 114 MG/DL — HIGH (ref 70–99)
GLUCOSE UR QL: NEGATIVE MG/DL — SIGNIFICANT CHANGE UP
HCT VFR BLD CALC: 47 % — HIGH (ref 34.5–45)
HGB BLD-MCNC: 15.8 G/DL — HIGH (ref 11.5–15.5)
IMM GRANULOCYTES # BLD AUTO: 0.03 K/UL — SIGNIFICANT CHANGE UP (ref 0–0.07)
IMM GRANULOCYTES NFR BLD AUTO: 0.3 % — SIGNIFICANT CHANGE UP (ref 0–0.9)
INR BLD: 0.89 RATIO — SIGNIFICANT CHANGE UP (ref 0.85–1.16)
KETONES UR QL: ABNORMAL MG/DL
LACTATE SERPL-SCNC: 0.7 MMOL/L — SIGNIFICANT CHANGE UP (ref 0.7–2)
LEUKOCYTE ESTERASE UR-ACNC: ABNORMAL
LIDOCAIN IGE QN: 39 U/L — SIGNIFICANT CHANGE UP (ref 13–75)
LYMPHOCYTES # BLD AUTO: 3.45 K/UL — HIGH (ref 1–3.3)
LYMPHOCYTES NFR BLD AUTO: 37.9 % — SIGNIFICANT CHANGE UP (ref 13–44)
MCHC RBC-ENTMCNC: 30.2 PG — SIGNIFICANT CHANGE UP (ref 27–34)
MCHC RBC-ENTMCNC: 33.6 G/DL — SIGNIFICANT CHANGE UP (ref 32–36)
MCV RBC AUTO: 89.7 FL — SIGNIFICANT CHANGE UP (ref 80–100)
MONOCYTES # BLD AUTO: 0.92 K/UL — HIGH (ref 0–0.9)
MONOCYTES NFR BLD AUTO: 10.1 % — SIGNIFICANT CHANGE UP (ref 2–14)
NEUTROPHILS # BLD AUTO: 4.48 K/UL — SIGNIFICANT CHANGE UP (ref 1.8–7.4)
NEUTROPHILS NFR BLD AUTO: 49.2 % — SIGNIFICANT CHANGE UP (ref 43–77)
NITRITE UR-MCNC: NEGATIVE — SIGNIFICANT CHANGE UP
NRBC # BLD AUTO: 0 K/UL — SIGNIFICANT CHANGE UP (ref 0–0)
NRBC # FLD: 0 K/UL — SIGNIFICANT CHANGE UP (ref 0–0)
NRBC BLD AUTO-RTO: 0 /100 WBCS — SIGNIFICANT CHANGE UP (ref 0–0)
PH UR: 5.5 — SIGNIFICANT CHANGE UP (ref 5–8)
PLATELET # BLD AUTO: 282 K/UL — SIGNIFICANT CHANGE UP (ref 150–400)
PMV BLD: 10 FL — SIGNIFICANT CHANGE UP (ref 7–13)
POTASSIUM SERPL-MCNC: 4.4 MMOL/L — SIGNIFICANT CHANGE UP (ref 3.5–5.3)
POTASSIUM SERPL-SCNC: 4.4 MMOL/L — SIGNIFICANT CHANGE UP (ref 3.5–5.3)
PROT SERPL-MCNC: 7.5 GM/DL — SIGNIFICANT CHANGE UP (ref 6–8.3)
PROT UR-MCNC: SIGNIFICANT CHANGE UP MG/DL
PROTHROM AB SERPL-ACNC: 10.3 SEC — SIGNIFICANT CHANGE UP (ref 9.9–13.4)
RBC # BLD: 5.24 M/UL — HIGH (ref 3.8–5.2)
RBC # FLD: 12.6 % — SIGNIFICANT CHANGE UP (ref 10.3–14.5)
RBC CASTS # UR COMP ASSIST: 136 /HPF — HIGH (ref 0–4)
SODIUM SERPL-SCNC: 138 MMOL/L — SIGNIFICANT CHANGE UP (ref 135–145)
SP GR SPEC: 1.02 — SIGNIFICANT CHANGE UP (ref 1–1.03)
SQUAMOUS # UR AUTO: 10 /HPF — HIGH (ref 0–5)
UROBILINOGEN FLD QL: 1 MG/DL — SIGNIFICANT CHANGE UP (ref 0.2–1)
WBC # BLD: 9.1 K/UL — SIGNIFICANT CHANGE UP (ref 3.8–10.5)
WBC # FLD AUTO: 9.1 K/UL — SIGNIFICANT CHANGE UP (ref 3.8–10.5)
WBC UR QL: 62 /HPF — HIGH (ref 0–5)

## 2025-08-14 PROCEDURE — 74176 CT ABD & PELVIS W/O CONTRAST: CPT | Mod: 26

## 2025-08-14 PROCEDURE — 80048 BASIC METABOLIC PNL TOTAL CA: CPT

## 2025-08-14 PROCEDURE — 85610 PROTHROMBIN TIME: CPT

## 2025-08-14 PROCEDURE — 85027 COMPLETE CBC AUTOMATED: CPT

## 2025-08-14 PROCEDURE — 36415 COLL VENOUS BLD VENIPUNCTURE: CPT

## 2025-08-14 PROCEDURE — 99285 EMERGENCY DEPT VISIT HI MDM: CPT

## 2025-08-14 RX ORDER — ONDANSETRON HCL/PF 4 MG/2 ML
4 VIAL (ML) INJECTION ONCE
Refills: 0 | Status: DISCONTINUED | OUTPATIENT
Start: 2025-08-14 | End: 2025-08-15

## 2025-08-14 RX ORDER — ACETAMINOPHEN 500 MG/5ML
650 LIQUID (ML) ORAL ONCE
Refills: 0 | Status: DISCONTINUED | OUTPATIENT
Start: 2025-08-14 | End: 2025-08-15

## 2025-08-14 RX ORDER — CEFTRIAXONE 500 MG/1
1000 INJECTION, POWDER, FOR SOLUTION INTRAMUSCULAR; INTRAVENOUS ONCE
Refills: 0 | Status: DISCONTINUED | OUTPATIENT
Start: 2025-08-14 | End: 2025-08-14

## 2025-08-14 RX ORDER — AMLODIPINE BESYLATE AND VALSARTAN 5; 320 MG/1; MG/1
1 TABLET, FILM COATED ORAL
Refills: 0 | DISCHARGE

## 2025-08-14 RX ORDER — OXYCODONE HYDROCHLORIDE AND ACETAMINOPHEN 10; 325 MG/1; MG/1
1 TABLET ORAL ONCE
Refills: 0 | Status: DISCONTINUED | OUTPATIENT
Start: 2025-08-14 | End: 2025-08-15

## 2025-08-14 RX ORDER — KETOROLAC TROMETHAMINE 30 MG/ML
15 INJECTION, SOLUTION INTRAMUSCULAR; INTRAVENOUS ONCE
Refills: 0 | Status: DISCONTINUED | OUTPATIENT
Start: 2025-08-14 | End: 2025-08-14

## 2025-08-14 RX ORDER — CEFTRIAXONE 500 MG/1
1000 INJECTION, POWDER, FOR SOLUTION INTRAMUSCULAR; INTRAVENOUS ONCE
Refills: 0 | Status: COMPLETED | OUTPATIENT
Start: 2025-08-14 | End: 2025-08-14

## 2025-08-14 RX ORDER — TRAMADOL HYDROCHLORIDE 50 MG/1
50 TABLET, FILM COATED ORAL ONCE
Refills: 0 | Status: DISCONTINUED | OUTPATIENT
Start: 2025-08-14 | End: 2025-08-15

## 2025-08-14 RX ADMIN — Medication 1000 MILLILITER(S): at 19:45

## 2025-08-14 RX ADMIN — KETOROLAC TROMETHAMINE 15 MILLIGRAM(S): 30 INJECTION, SOLUTION INTRAMUSCULAR; INTRAVENOUS at 19:45

## 2025-08-14 RX ADMIN — CEFTRIAXONE 1000 MILLIGRAM(S): 500 INJECTION, POWDER, FOR SOLUTION INTRAMUSCULAR; INTRAVENOUS at 21:20

## 2025-08-15 ENCOUNTER — TRANSCRIPTION ENCOUNTER (OUTPATIENT)
Age: 65
End: 2025-08-15

## 2025-08-15 VITALS
RESPIRATION RATE: 18 BRPM | OXYGEN SATURATION: 97 % | TEMPERATURE: 97 F | DIASTOLIC BLOOD PRESSURE: 75 MMHG | SYSTOLIC BLOOD PRESSURE: 139 MMHG | HEART RATE: 63 BPM

## 2025-08-15 LAB
ANION GAP SERPL CALC-SCNC: 4 MMOL/L — LOW (ref 5–17)
BUN SERPL-MCNC: 18 MG/DL — SIGNIFICANT CHANGE UP (ref 7–23)
CALCIUM SERPL-MCNC: 8.8 MG/DL — SIGNIFICANT CHANGE UP (ref 8.5–10.1)
CHLORIDE SERPL-SCNC: 110 MMOL/L — HIGH (ref 96–108)
CO2 SERPL-SCNC: 26 MMOL/L — SIGNIFICANT CHANGE UP (ref 22–31)
CREAT SERPL-MCNC: 0.55 MG/DL — SIGNIFICANT CHANGE UP (ref 0.5–1.3)
CULTURE RESULTS: SIGNIFICANT CHANGE UP
EGFR: 102 ML/MIN/1.73M2 — SIGNIFICANT CHANGE UP
EGFR: 102 ML/MIN/1.73M2 — SIGNIFICANT CHANGE UP
GLUCOSE SERPL-MCNC: 105 MG/DL — HIGH (ref 70–99)
HCT VFR BLD CALC: 44.2 % — SIGNIFICANT CHANGE UP (ref 34.5–45)
HGB BLD-MCNC: 14.3 G/DL — SIGNIFICANT CHANGE UP (ref 11.5–15.5)
INR BLD: 0.95 RATIO — SIGNIFICANT CHANGE UP (ref 0.85–1.16)
MCHC RBC-ENTMCNC: 29.5 PG — SIGNIFICANT CHANGE UP (ref 27–34)
MCHC RBC-ENTMCNC: 32.4 G/DL — SIGNIFICANT CHANGE UP (ref 32–36)
MCV RBC AUTO: 91.3 FL — SIGNIFICANT CHANGE UP (ref 80–100)
NRBC # BLD AUTO: 0 K/UL — SIGNIFICANT CHANGE UP (ref 0–0)
NRBC # FLD: 0 K/UL — SIGNIFICANT CHANGE UP (ref 0–0)
NRBC BLD AUTO-RTO: 0 /100 WBCS — SIGNIFICANT CHANGE UP (ref 0–0)
PLATELET # BLD AUTO: 224 K/UL — SIGNIFICANT CHANGE UP (ref 150–400)
PMV BLD: 10.4 FL — SIGNIFICANT CHANGE UP (ref 7–13)
POTASSIUM SERPL-MCNC: 4 MMOL/L — SIGNIFICANT CHANGE UP (ref 3.5–5.3)
POTASSIUM SERPL-SCNC: 4 MMOL/L — SIGNIFICANT CHANGE UP (ref 3.5–5.3)
PROTHROM AB SERPL-ACNC: 11.2 SEC — SIGNIFICANT CHANGE UP (ref 9.9–13.4)
RBC # BLD: 4.84 M/UL — SIGNIFICANT CHANGE UP (ref 3.8–5.2)
RBC # FLD: 12.7 % — SIGNIFICANT CHANGE UP (ref 10.3–14.5)
SODIUM SERPL-SCNC: 140 MMOL/L — SIGNIFICANT CHANGE UP (ref 135–145)
SPECIMEN SOURCE: SIGNIFICANT CHANGE UP
WBC # BLD: 6.07 K/UL — SIGNIFICANT CHANGE UP (ref 3.8–10.5)
WBC # FLD AUTO: 6.07 K/UL — SIGNIFICANT CHANGE UP (ref 3.8–10.5)

## 2025-08-15 PROCEDURE — 99236 HOSP IP/OBS SAME DATE HI 85: CPT

## 2025-08-15 RX ORDER — TAMSULOSIN HYDROCHLORIDE 0.4 MG/1
0.4 CAPSULE ORAL AT BEDTIME
Refills: 0 | Status: DISCONTINUED | OUTPATIENT
Start: 2025-08-15 | End: 2025-08-15

## 2025-08-15 RX ORDER — AMLODIPINE BESYLATE 10 MG/1
10 TABLET ORAL DAILY
Refills: 0 | Status: DISCONTINUED | OUTPATIENT
Start: 2025-08-15 | End: 2025-08-15

## 2025-08-15 RX ORDER — TAMSULOSIN HYDROCHLORIDE 0.4 MG/1
1 CAPSULE ORAL
Qty: 30 | Refills: 0
Start: 2025-08-15 | End: 2025-09-13

## 2025-08-15 RX ORDER — MAGNESIUM, ALUMINUM HYDROXIDE 200-200 MG
30 TABLET,CHEWABLE ORAL EVERY 4 HOURS
Refills: 0 | Status: DISCONTINUED | OUTPATIENT
Start: 2025-08-15 | End: 2025-08-15

## 2025-08-15 RX ORDER — CEFTRIAXONE 500 MG/1
1000 INJECTION, POWDER, FOR SOLUTION INTRAMUSCULAR; INTRAVENOUS EVERY 24 HOURS
Refills: 0 | Status: DISCONTINUED | OUTPATIENT
Start: 2025-08-15 | End: 2025-08-15

## 2025-08-15 RX ORDER — CEFUROXIME SODIUM 1.5 G
1 VIAL (EA) INJECTION
Qty: 14 | Refills: 0
Start: 2025-08-15 | End: 2025-08-21

## 2025-08-15 RX ORDER — MELATONIN 5 MG
3 TABLET ORAL AT BEDTIME
Refills: 0 | Status: DISCONTINUED | OUTPATIENT
Start: 2025-08-15 | End: 2025-08-15

## 2025-08-15 RX ADMIN — Medication 50 MILLILITER(S): at 04:26

## 2025-08-15 RX ADMIN — AMLODIPINE BESYLATE 10 MILLIGRAM(S): 10 TABLET ORAL at 10:38

## 2025-08-20 LAB
CULTURE RESULTS: SIGNIFICANT CHANGE UP
CULTURE RESULTS: SIGNIFICANT CHANGE UP
SPECIMEN SOURCE: SIGNIFICANT CHANGE UP
SPECIMEN SOURCE: SIGNIFICANT CHANGE UP

## 2025-08-21 ENCOUNTER — APPOINTMENT (OUTPATIENT)
Dept: RADIOLOGY | Facility: CLINIC | Age: 65
End: 2025-08-21
Payer: MEDICAID

## 2025-08-21 ENCOUNTER — OUTPATIENT (OUTPATIENT)
Dept: OUTPATIENT SERVICES | Facility: HOSPITAL | Age: 65
LOS: 1 days | End: 2025-08-21
Payer: MEDICAID

## 2025-08-21 ENCOUNTER — APPOINTMENT (OUTPATIENT)
Dept: UROLOGY | Facility: CLINIC | Age: 65
End: 2025-08-21
Payer: MEDICAID

## 2025-08-21 VITALS
HEIGHT: 62 IN | SYSTOLIC BLOOD PRESSURE: 160 MMHG | WEIGHT: 205 LBS | BODY MASS INDEX: 37.73 KG/M2 | RESPIRATION RATE: 15 BRPM | DIASTOLIC BLOOD PRESSURE: 88 MMHG | OXYGEN SATURATION: 93 % | HEART RATE: 75 BPM

## 2025-08-21 DIAGNOSIS — Z87.442 PERSONAL HISTORY OF URINARY CALCULI: ICD-10-CM

## 2025-08-21 DIAGNOSIS — N20.1 CALCULUS OF URETER: ICD-10-CM

## 2025-08-21 PROCEDURE — 74018 RADEX ABDOMEN 1 VIEW: CPT

## 2025-08-21 PROCEDURE — 99204 OFFICE O/P NEW MOD 45 MIN: CPT

## 2025-08-21 PROCEDURE — 74018 RADEX ABDOMEN 1 VIEW: CPT | Mod: 26

## 2025-08-21 RX ORDER — ALLOPURINOL 100 MG/1
100 TABLET ORAL DAILY
Qty: 90 | Refills: 3 | Status: ACTIVE | COMMUNITY
Start: 2025-08-21 | End: 1900-01-01

## 2025-08-21 RX ORDER — HYDROCHLOROTHIAZIDE 12.5 MG/1
12.5 TABLET ORAL
Qty: 90 | Refills: 3 | Status: ACTIVE | COMMUNITY
Start: 2025-08-21 | End: 1900-01-01

## 2025-08-21 RX ORDER — POTASSIUM CITRATE 10 MEQ/1
10 MEQ TABLET, EXTENDED RELEASE ORAL TWICE DAILY
Qty: 180 | Refills: 3 | Status: ACTIVE | COMMUNITY
Start: 2025-08-21 | End: 1900-01-01

## 2025-08-22 DIAGNOSIS — N13.6 PYONEPHROSIS: ICD-10-CM

## 2025-08-22 DIAGNOSIS — I10 ESSENTIAL (PRIMARY) HYPERTENSION: ICD-10-CM

## 2025-09-11 ENCOUNTER — TRANSCRIPTION ENCOUNTER (OUTPATIENT)
Age: 65
End: 2025-09-11

## 2025-09-16 ENCOUNTER — APPOINTMENT (OUTPATIENT)
Dept: UROLOGY | Facility: CLINIC | Age: 65
End: 2025-09-16
Payer: MEDICAID

## 2025-09-16 VITALS
SYSTOLIC BLOOD PRESSURE: 144 MMHG | DIASTOLIC BLOOD PRESSURE: 80 MMHG | HEIGHT: 62 IN | OXYGEN SATURATION: 96 % | BODY MASS INDEX: 37.73 KG/M2 | WEIGHT: 205.03 LBS | RESPIRATION RATE: 16 BRPM | HEART RATE: 78 BPM

## 2025-09-16 DIAGNOSIS — N20.1 CALCULUS OF URETER: ICD-10-CM

## 2025-09-16 PROCEDURE — 99213 OFFICE O/P EST LOW 20 MIN: CPT
